# Patient Record
Sex: FEMALE | Race: WHITE | NOT HISPANIC OR LATINO | Employment: OTHER | URBAN - METROPOLITAN AREA
[De-identification: names, ages, dates, MRNs, and addresses within clinical notes are randomized per-mention and may not be internally consistent; named-entity substitution may affect disease eponyms.]

---

## 2017-07-11 ENCOUNTER — TELEPHONE (OUTPATIENT)
Dept: GASTROENTEROLOGY | Facility: CLINIC | Age: 67
End: 2017-07-11

## 2017-07-12 ENCOUNTER — TELEPHONE (OUTPATIENT)
Dept: GASTROENTEROLOGY | Facility: CLINIC | Age: 67
End: 2017-07-12

## 2017-07-13 ENCOUNTER — TELEPHONE (OUTPATIENT)
Dept: GASTROENTEROLOGY | Facility: CLINIC | Age: 67
End: 2017-07-13

## 2017-07-13 NOTE — TELEPHONE ENCOUNTER
----- Message from Afshan Chapman MA sent at 7/13/2017  3:04 PM CDT -----  Contact: self  [phone]  States she is returning your call regarding an appointment on Friday, 7-14-17.  States she doesn't want the appointment because Dr. [facility] recommended her to Dr. Delano Richey and she is stating he is with gastro.  I checked and I told her he is with Cardio but she is wanting to speak to you regarding an appointment with him in gastro.

## 2017-07-13 NOTE — TELEPHONE ENCOUNTER
Spoke with patient.    Offered appt for tomorrow at 2pm for Dr. Mendiola.    Patient stated she will call back later and let us know if she can get off.

## 2017-07-14 ENCOUNTER — OFFICE VISIT (OUTPATIENT)
Dept: GASTROENTEROLOGY | Facility: CLINIC | Age: 67
End: 2017-07-14
Payer: MEDICARE

## 2017-07-14 VITALS
SYSTOLIC BLOOD PRESSURE: 217 MMHG | DIASTOLIC BLOOD PRESSURE: 90 MMHG | BODY MASS INDEX: 33.55 KG/M2 | HEART RATE: 65 BPM | WEIGHT: 182.31 LBS | HEIGHT: 62 IN

## 2017-07-14 DIAGNOSIS — I15.9 SECONDARY HYPERTENSION: ICD-10-CM

## 2017-07-14 DIAGNOSIS — R11.0 NAUSEA: ICD-10-CM

## 2017-07-14 DIAGNOSIS — R13.10 DYSPHAGIA, UNSPECIFIED TYPE: Primary | ICD-10-CM

## 2017-07-14 DIAGNOSIS — K21.9 GASTROESOPHAGEAL REFLUX DISEASE, ESOPHAGITIS PRESENCE NOT SPECIFIED: ICD-10-CM

## 2017-07-14 DIAGNOSIS — K22.0 ACHALASIA: ICD-10-CM

## 2017-07-14 PROCEDURE — 99214 OFFICE O/P EST MOD 30 MIN: CPT | Mod: PBBFAC | Performed by: INTERNAL MEDICINE

## 2017-07-14 PROCEDURE — 1159F MED LIST DOCD IN RCRD: CPT | Mod: ,,, | Performed by: INTERNAL MEDICINE

## 2017-07-14 PROCEDURE — 99204 OFFICE O/P NEW MOD 45 MIN: CPT | Mod: S$PBB,,, | Performed by: INTERNAL MEDICINE

## 2017-07-14 PROCEDURE — 99999 PR PBB SHADOW E&M-EST. PATIENT-LVL IV: CPT | Mod: PBBFAC,,, | Performed by: INTERNAL MEDICINE

## 2017-07-14 NOTE — PROGRESS NOTES
[facility] Consultation Note    Reason for Consult:    Chief Complaint   Patient presents with    Dysphagia    Heartburn    Other     achalasia    Weight Loss    Emesis         PCP:   Primary Doctor No       Referring MD:  Dr. Carlee Lord     HPI:  Alena Anderson is a 67 y.o. female referred to motility clinic by Dr. Lord for evaluation of the following GI problems:    Dysphagia.  Achalasia. Patient reports difficulty swallowing solids and liquids.  Feels that food gets lodged in cervical esophagus.      Symptoms started in 12/2016. Occur constantly. Assocaited with horse voice that developed in the past few weeks.     EGD with food and fluid filled esophagus. Manometry showes achalasia - type ii. Has nocturnal cough. Spits up flegm.     GERD.  Patient reports bothersome heartburn in mid chest.  Regurgitation of acidic liquids.  Daily. No improvemetn w nexium, prilosec, .   Some improvement w Mylanta.        Emesis.  Occasional vomiting.  No nausea.     Weight loss. Lost 40lb since 5/2016.      Denies vomiting, early satiety, abdominal pain, bloating, diarrhea, constipation, BRBPR, melena, weight loss.       Previous Studies:  Manometry 6/21/2017: Elevated lES with elevated IRP 32mmHg.  Panesophageal pressurization consistent w Type II achalasia. Poor bolus transit.     EGD 6/22/2017: Food and liquid in esophagus without structural obstruction. Diffuse gastric erythema. Gastric antral changes suggestive of IM.        ROS:  ROS   Constitutional: No fevers, no chills, no night sweats, + weight loss  ENT: No congestion, + rhinorrhea, no chronic sinus problems  CV: No chest pain, no palpitations  Pulm: + cough, no shortness of breath  Ophtho: No blurry vision, no eye redness  GI: see HPI  Derm: No rash  Heme: No lymphadenopathy, no bruising  MSK: No arthritis, no joint swelling, no Raynauds  : No dysuria, no frequent urination, no blood in urine  Endo: No hot or cold  "intolerance  Neuro: No dizziness, no syncope, no seizure  Psych: No anxiety, no depression        Medical History:   Past Medical History:   Diagnosis Date    Hypertension         Surgical History:   Past Surgical History:   Procedure Laterality Date    APPENDECTOMY      broken leg          Family History:   Family History   Problem Relation Age of Onset    Celiac disease Neg Hx     Cirrhosis Neg Hx     Colon cancer Neg Hx     Crohn's disease Neg Hx     Colon polyps Neg Hx     Cystic fibrosis Neg Hx     Esophageal cancer Neg Hx     Hemochromatosis Neg Hx     Inflammatory bowel disease Neg Hx     Irritable bowel syndrome Neg Hx     Liver cancer Neg Hx     Liver disease Neg Hx     Rectal cancer Neg Hx     Stomach cancer Neg Hx     Ulcerative colitis Neg Hx     Bhanu's disease Neg Hx         Social History:   Social History     Social History    Marital status:      Spouse name: N/A    Number of children: N/A    Years of education: N/A     Social History Main Topics    Smoking status: Never Smoker    Smokeless tobacco: Never Used    Alcohol use No    Drug use: No    Sexual activity: Not Asked     Other Topics Concern    None     Social History Narrative    None        Review of patient's allergies indicates:   Allergen Reactions    Codeine     Morphine     Sulfa (sulfonamide antibiotics)        Current Outpatient Prescriptions   Medication Sig Dispense Refill    SPIRONOLACTONE (ALDACTONE ORAL) Take by mouth.       No current facility-administered medications for this visit.         Objective Findings:  Vital Signs:  BP (!) 217/90   Pulse 65   Ht 5' 2" (1.575 m)   Wt 82.7 kg (182 lb 5.1 oz)   BMI 33.35 kg/m²   Body mass index is 33.35 kg/m².  Repeat /60     Physical Exam:  General appearance: alert, cooperative, no distress  HENT: Normocephalic, atraumatic, neck symmetrical, no nasal discharge  Eyes: conjunctivae/corneas clear, PERRL, EOM's intact  Lungs: clear to " auscultation bilaterally, no dullness to percussion bilaterally  Heart: regular rate and rhythm without rub; no displacement of the PMI  Abdomen: soft, non-tender; bowel sounds normoactive; no organomegaly  Extremities: extremities symmetric; no clubbing, cyanosis, or edema  Integument: Skin color, texture, turgor normal; no rashes; hair distrubution normal  Neurologic: Alert and oriented X 3, normal strength, normal coordination and gait  Psychiatric: no pressured speech; normal affect; no evidence of impaired cognition    Labs:  No results found for: WBC, HGB, HCT, MCV, PLT  No results found for: NA, K, CL, CO2, GLU, BUN, CREATININE, CALCIUM, PROT, ALBUMIN, BILITOT, ALKPHOS, AST, ALT      Assessment and Plan:  Alena Anderson is a 67 y.o. female with history of HTN here for management of achalasia:    Dysphagia to solids and liquids since 12/2016. Type II achalasia on outside manometry. EGD with food and fluid filled esophagus.   [place] [facility] [place]Cont full liquid diet  -Check EGD w E/G bx - 2nd floor    -Esophagogram w 13mm tablet  -Discussed pathophysiology, presentation and treatment of achalasia, including botox, pneumatic dilation, myotomy, POEM. Pt would like to proceed with surgery.   -Ref to Dr. Livingston for myotomy    GERD.    -Mylanta prn  -Nexium prn     Emesis.      Weight loss. Lost 40lb since 5/2016.    -Check Ct chest to r/o paraneoplastic syndrome  -Protein shakes TID     HTN. Repeat /60. In process of changing medication. Asymptomatic. PT will see her PCP in am.      Return in about 3 months (around 10/14/2017).    1. Dysphagia, unspecified type    2. Achalasia    3. Nausea    4. Gastroesophageal reflux disease, esophagitis presence not specified    5. Secondary hypertension          Order summary:  Orders Placed This Encounter    FL Esophagram Complete    CT Chest With Contrast    Ambulatory referral to General Surgery         Thank you so much for allowing me to  participate in the care of Alena Mendiola MD

## 2017-07-17 ENCOUNTER — TELEPHONE (OUTPATIENT)
Dept: ENDOSCOPY | Facility: HOSPITAL | Age: 67
End: 2017-07-17

## 2017-07-17 RX ORDER — BENAZEPRIL HYDROCHLORIDE 20 MG/1
20 TABLET ORAL EVERY MORNING
COMMUNITY

## 2017-07-17 RX ORDER — LOVASTATIN 20 MG/1
20 TABLET ORAL EVERY MORNING
COMMUNITY

## 2017-07-17 RX ORDER — NAPROXEN SODIUM 220 MG/1
81 TABLET, FILM COATED ORAL DAILY
COMMUNITY

## 2017-07-17 NOTE — TELEPHONE ENCOUNTER
Patient contacted to schedule EGD.  Scheduled for 7/26/17 at 9:30 am with Dr Mendiola on 2nd floor.

## 2017-07-20 ENCOUNTER — TELEPHONE (OUTPATIENT)
Dept: GASTROENTEROLOGY | Facility: CLINIC | Age: 67
End: 2017-07-20

## 2017-07-20 ENCOUNTER — TELEPHONE (OUTPATIENT)
Dept: ENDOSCOPY | Facility: HOSPITAL | Age: 67
End: 2017-07-20

## 2017-07-20 NOTE — TELEPHONE ENCOUNTER
Received phone call from patient regarding upcoming EGD on 7/26/17. Reviewed prep instructions-stated understanding.  Patient inquiring about other tests that were ordered by Dr Mendiola. She stated that she is trying to get everything done ASAP and is not able to reach anyone regarding scheduling them.  Informed her that I would send a message to Dr Mendiola and request someone to get in touch with her to assist in getting it taken care of.

## 2017-07-20 NOTE — TELEPHONE ENCOUNTER
Attempted to contact patient without success.    This call is regarding the CT chest and esophagram.    Unable to leave message, voicemail box is not set up.

## 2017-07-21 NOTE — TELEPHONE ENCOUNTER
Gina,  Please attempt again to reach Ms. Anderson to set up test/referral recommended by Dr. Mendiola.  Thanks,  An

## 2017-07-24 ENCOUNTER — TELEPHONE (OUTPATIENT)
Dept: GASTROENTEROLOGY | Facility: CLINIC | Age: 67
End: 2017-07-24

## 2017-07-24 NOTE — TELEPHONE ENCOUNTER
Attempted to contact patient without success.    Unable to leave message due to voicemail not set up. This is the second attempt to contact patient.  House phone is busy unable to get through.    This call is regarding to schedule CT and Esophagram.

## 2017-07-26 ENCOUNTER — ANESTHESIA EVENT (OUTPATIENT)
Dept: ENDOSCOPY | Facility: HOSPITAL | Age: 67
End: 2017-07-26
Payer: MEDICARE

## 2017-07-26 ENCOUNTER — TELEPHONE (OUTPATIENT)
Dept: GASTROENTEROLOGY | Facility: CLINIC | Age: 67
End: 2017-07-26

## 2017-07-26 ENCOUNTER — SURGERY (OUTPATIENT)
Age: 67
End: 2017-07-26

## 2017-07-26 ENCOUNTER — ANESTHESIA (OUTPATIENT)
Dept: ENDOSCOPY | Facility: HOSPITAL | Age: 67
End: 2017-07-26
Payer: MEDICARE

## 2017-07-26 ENCOUNTER — HOSPITAL ENCOUNTER (OUTPATIENT)
Facility: HOSPITAL | Age: 67
Discharge: HOME | End: 2017-07-26
Attending: INTERNAL MEDICINE | Admitting: INTERNAL MEDICINE
Payer: MEDICARE

## 2017-07-26 VITALS
DIASTOLIC BLOOD PRESSURE: 60 MMHG | HEART RATE: 65 BPM | SYSTOLIC BLOOD PRESSURE: 142 MMHG | RESPIRATION RATE: 18 BRPM | TEMPERATURE: 98 F | WEIGHT: 175 LBS | OXYGEN SATURATION: 99 % | BODY MASS INDEX: 32.2 KG/M2 | HEIGHT: 62 IN

## 2017-07-26 DIAGNOSIS — R13.10 DYSPHAGIA, UNSPECIFIED TYPE: Primary | ICD-10-CM

## 2017-07-26 DIAGNOSIS — K22.0 ACHALASIA: ICD-10-CM

## 2017-07-26 PROCEDURE — 88305 TISSUE EXAM BY PATHOLOGIST: CPT | Mod: 26,,, | Performed by: PATHOLOGY

## 2017-07-26 PROCEDURE — 25000003 PHARM REV CODE 250: Performed by: NURSE ANESTHETIST, CERTIFIED REGISTERED

## 2017-07-26 PROCEDURE — 27201012 HC FORCEPS, HOT/COLD, DISP: Performed by: INTERNAL MEDICINE

## 2017-07-26 PROCEDURE — 43239 EGD BIOPSY SINGLE/MULTIPLE: CPT | Performed by: INTERNAL MEDICINE

## 2017-07-26 PROCEDURE — 25000003 PHARM REV CODE 250: Performed by: INTERNAL MEDICINE

## 2017-07-26 PROCEDURE — 63600175 PHARM REV CODE 636 W HCPCS: Performed by: INTERNAL MEDICINE

## 2017-07-26 PROCEDURE — 43239 EGD BIOPSY SINGLE/MULTIPLE: CPT | Mod: ,,, | Performed by: INTERNAL MEDICINE

## 2017-07-26 PROCEDURE — 88312 SPECIAL STAINS GROUP 1: CPT | Mod: 26,,, | Performed by: PATHOLOGY

## 2017-07-26 PROCEDURE — 37000009 HC ANESTHESIA EA ADD 15 MINS: Performed by: INTERNAL MEDICINE

## 2017-07-26 PROCEDURE — 63600175 PHARM REV CODE 636 W HCPCS: Performed by: NURSE ANESTHETIST, CERTIFIED REGISTERED

## 2017-07-26 PROCEDURE — 37000008 HC ANESTHESIA 1ST 15 MINUTES: Performed by: INTERNAL MEDICINE

## 2017-07-26 PROCEDURE — D9220A PRA ANESTHESIA: Mod: ANES,,, | Performed by: ANESTHESIOLOGY

## 2017-07-26 PROCEDURE — D9220A PRA ANESTHESIA: Mod: CRNA,,, | Performed by: NURSE ANESTHETIST, CERTIFIED REGISTERED

## 2017-07-26 PROCEDURE — C1773 RET DEV, INSERTABLE: HCPCS | Performed by: INTERNAL MEDICINE

## 2017-07-26 PROCEDURE — 88305 TISSUE EXAM BY PATHOLOGIST: CPT | Performed by: PATHOLOGY

## 2017-07-26 PROCEDURE — 88342 IMHCHEM/IMCYTCHM 1ST ANTB: CPT | Mod: 26,,, | Performed by: PATHOLOGY

## 2017-07-26 RX ORDER — MIDAZOLAM HYDROCHLORIDE 1 MG/ML
INJECTION INTRAMUSCULAR; INTRAVENOUS
Status: DISCONTINUED | OUTPATIENT
Start: 2017-07-26 | End: 2017-07-26

## 2017-07-26 RX ORDER — PROPOFOL 10 MG/ML
VIAL (ML) INTRAVENOUS
Status: DISCONTINUED | OUTPATIENT
Start: 2017-07-26 | End: 2017-07-26

## 2017-07-26 RX ORDER — ONDANSETRON 2 MG/ML
INJECTION INTRAMUSCULAR; INTRAVENOUS
Status: DISCONTINUED | OUTPATIENT
Start: 2017-07-26 | End: 2017-07-26

## 2017-07-26 RX ORDER — FENTANYL CITRATE 50 UG/ML
INJECTION, SOLUTION INTRAMUSCULAR; INTRAVENOUS
Status: DISCONTINUED | OUTPATIENT
Start: 2017-07-26 | End: 2017-07-26

## 2017-07-26 RX ORDER — SUCCINYLCHOLINE CHLORIDE 20 MG/ML
INJECTION INTRAMUSCULAR; INTRAVENOUS
Status: DISCONTINUED | OUTPATIENT
Start: 2017-07-26 | End: 2017-07-26

## 2017-07-26 RX ORDER — LIDOCAINE HCL/PF 100 MG/5ML
SYRINGE (ML) INTRAVENOUS
Status: DISCONTINUED | OUTPATIENT
Start: 2017-07-26 | End: 2017-07-26

## 2017-07-26 RX ORDER — HYDROMORPHONE HYDROCHLORIDE 1 MG/ML
0.2 INJECTION, SOLUTION INTRAMUSCULAR; INTRAVENOUS; SUBCUTANEOUS EVERY 5 MIN PRN
Status: DISCONTINUED | OUTPATIENT
Start: 2017-07-26 | End: 2017-07-26 | Stop reason: HOSPADM

## 2017-07-26 RX ORDER — ROCURONIUM BROMIDE 10 MG/ML
INJECTION, SOLUTION INTRAVENOUS
Status: DISCONTINUED | OUTPATIENT
Start: 2017-07-26 | End: 2017-07-26

## 2017-07-26 RX ORDER — SODIUM CHLORIDE 0.9 % (FLUSH) 0.9 %
3 SYRINGE (ML) INJECTION
Status: DISCONTINUED | OUTPATIENT
Start: 2017-07-26 | End: 2017-07-26 | Stop reason: HOSPADM

## 2017-07-26 RX ORDER — SODIUM CHLORIDE 9 MG/ML
INJECTION, SOLUTION INTRAVENOUS CONTINUOUS
Status: DISCONTINUED | OUTPATIENT
Start: 2017-07-26 | End: 2017-07-26 | Stop reason: HOSPADM

## 2017-07-26 RX ADMIN — ONDANSETRON 4 MG: 2 INJECTION INTRAMUSCULAR; INTRAVENOUS at 10:07

## 2017-07-26 RX ADMIN — SODIUM CHLORIDE: 0.9 INJECTION, SOLUTION INTRAVENOUS at 09:07

## 2017-07-26 RX ADMIN — LIDOCAINE HYDROCHLORIDE 100 MG: 20 INJECTION, SOLUTION INTRAVENOUS at 10:07

## 2017-07-26 RX ADMIN — FENTANYL CITRATE 100 MCG: 50 INJECTION, SOLUTION INTRAMUSCULAR; INTRAVENOUS at 10:07

## 2017-07-26 RX ADMIN — PROPOFOL 130 MG: 10 INJECTION, EMULSION INTRAVENOUS at 10:07

## 2017-07-26 RX ADMIN — ROCURONIUM BROMIDE 10 MG: 10 INJECTION, SOLUTION INTRAVENOUS at 10:07

## 2017-07-26 RX ADMIN — SUCCINYLCHOLINE CHLORIDE 140 MG: 20 INJECTION, SOLUTION INTRAMUSCULAR; INTRAVENOUS at 10:07

## 2017-07-26 RX ADMIN — MIDAZOLAM HYDROCHLORIDE 2 MG: 1 INJECTION, SOLUTION INTRAMUSCULAR; INTRAVENOUS at 10:07

## 2017-07-26 RX ADMIN — FOLIC ACID: 5 INJECTION, SOLUTION INTRAMUSCULAR; INTRAVENOUS; SUBCUTANEOUS at 11:07

## 2017-07-26 NOTE — PLAN OF CARE
Plan of care discussed with pt and her family. Understanding verbalized. Pt states can discuss results with her famliy

## 2017-07-26 NOTE — PLAN OF CARE
Patient and family state they are ready to be discharged. Instructions  given to patient and family. Both verbalize understanding. Patient tolerating po liquids with no difficulty. Patient denies pain. Anesthesia consent and surgical consent in chart upon patient's discharge from [facility].

## 2017-07-26 NOTE — TRANSFER OF CARE
"Anesthesia Transfer of Care Note    Patient: Alena Anderson    Procedure(s) Performed: Procedure(s) (LRB):  ESOPHAGOGASTRODUODENOSCOPY (EGD) (N/A)    Patient location: PACU    Anesthesia Type: general    Transport from OR: Transported from OR on room air with adequate spontaneous ventilation    Post pain: adequate analgesia    Post assessment: no apparent anesthetic complications    Post vital signs: stable    Level of consciousness: awake    Nausea/Vomiting: no nausea/vomiting    Complications: none    Transfer of care protocol was followed      Last vitals:   Visit Vitals  BP (!) 171/104   Pulse 76   Temp 36.7 °C (98.1 °F) (Oral)   Resp 16   Ht 5' 2" (1.575 m)   Wt 79.4 kg (175 lb)   SpO2 100%   Breastfeeding? No   BMI 32.01 kg/m²     "

## 2017-07-26 NOTE — DISCHARGE INSTRUCTIONS
Upper GI Endoscopy     During endoscopy, a long, flexible tube is used to view the inside of your upper GI tract.      Upper GI endoscopy allows your healthcare provider to look directly into the beginning of your gastrointestinal (GI) tract. The esophagus, stomach, and duodenum (the first part of the small intestine) make up the upper GI tract.   Before the exam  Follow these and any other instructions you are given before your endoscopy. If you dont follow the healthcare providers instructions carefully, the test may need to be canceled or done over:  · Don't eat or drink anything after midnight the night before your exam. If your exam is in the afternoon, drink only clear liquids in the morning. Don't eat or drink anything for 8 hours before the exam. In some cases, you may be able to take medicines with sips of water until 2 hours before the procedure. Speak with your healthcare provider about this.   · Bring your X-rays and any other test results you have.  · Because you will be sedated, arrange for an adult to drive you home after the exam.  · Tell your healthcare provider before the exam if you are taking any medicines or have any medical problems.  The procedure  Here is what to expect:  · You will lie on the endoscopy table. Usually patients lie on the left side.  · You will be monitored and given oxygen.  · Your throat may be numbed with a spray or gargle. You are given medicine through an intravenous (IV) line that will help you relax and remain comfortable. You may be awake or asleep during the procedure.  · The healthcare provider will put the endoscope in your mouth and down your esophagus. It is thinner than most pieces of food that you swallow. It will not affect your breathing. The medicine helps keep you from gagging.  · Air is put into your GI tract to expand it. It can make you burp.  · During the procedure, the healthcare provider can take biopsies (tissue samples), remove abnormalities,  such as polyps, or treat abnormalities through a variety of devices placed through the endoscope. You will not feel this.   · The endoscope carries images of your upper GI tract to a video screen. If you are awake, you may be able to look at the images.  · After the procedure is done, you will rest for a time. An adult must drive you home.  When to call your healthcare provider  Contact your healthcare provider if you have:  · Black or tarry stools, or blood in your stool  · Fever  · Pain in your belly that does not go away  · Nausea and vomiting, or vomiting blood   Date Last Reviewed: 7/1/2016 © 2000-2016 [employer]. [address] [place] [address], [place] [address]. All rights reserved. This information is not intended as a substitute for professional medical care. Always follow your healthcare professional's instructions.        Procedural Sedation (Adult)  You have been given medicine by vein to make you sleep during your surgery. This may have included both a pain medicine and sleeping medicine. Most of the effects have worn off. But you may still have some drowsiness for the next 6 to 8 hours.  Home care  Follow these guidelines when you get home:  · For the next 8 hours, you should be watched by a responsible adult. This person should make sure your condition is not getting worse.  · Don't take any medicine by mouth for pain or for sleep during the next 4 hours. These might react with the medicines you were given in the hospital. This could cause a much stronger response than usual.  · Don't drink any alcohol for the next 24 hours.  · Don't drive, operate dangerous machinery, or make important business or personal decisions during the next 24 hours.  Follow-up care  Follow up with your healthcare provider if you are not alert and back to your usual level of activity within 12 hours.  When to seek medical advice  Call your healthcare provider right away if any of these occur:  · Drowsiness gets  worse  · Weakness or dizziness gets worse  · Repeated vomiting  · You cannot be awakened   Date Last Reviewed: 10/18/2016  © 0535-2825 [employer]. [address] [place] [address], [place] [address]. All rights reserved. This information is not intended as a substitute for professional medical care. Always follow your healthcare professional's instructions.

## 2017-07-26 NOTE — H&P (VIEW-ONLY)
[facility] Consultation Note    Reason for Consult:    Chief Complaint   Patient presents with    Dysphagia    Heartburn    Other     achalasia    Weight Loss    Emesis         PCP:   Primary Doctor No       Referring MD:  Dr. Carlee Lord     HPI:  Alena Anderson is a 67 y.o. female referred to motility clinic by Dr. Lord for evaluation of the following GI problems:    Dysphagia.  Achalasia. Patient reports difficulty swallowing solids and liquids.  Feels that food gets lodged in cervical esophagus.      Symptoms started in 12/2016. Occur constantly. Assocaited with horse voice that developed in the past few weeks.     EGD with food and fluid filled esophagus. Manometry showes achalasia - type ii. Has nocturnal cough. Spits up flegm.     GERD.  Patient reports bothersome heartburn in mid chest.  Regurgitation of acidic liquids.  Daily. No improvemetn w nexium, prilosec, .   Some improvement w Mylanta.        Emesis.  Occasional vomiting.  No nausea.     Weight loss. Lost 40lb since 5/2016.      Denies vomiting, early satiety, abdominal pain, bloating, diarrhea, constipation, BRBPR, melena, weight loss.       Previous Studies:  Manometry 6/21/2017: Elevated lES with elevated IRP 32mmHg.  Panesophageal pressurization consistent w Type II achalasia. Poor bolus transit.     EGD 6/22/2017: Food and liquid in esophagus without structural obstruction. Diffuse gastric erythema. Gastric antral changes suggestive of IM.        ROS:  ROS   Constitutional: No fevers, no chills, no night sweats, + weight loss  ENT: No congestion, + rhinorrhea, no chronic sinus problems  CV: No chest pain, no palpitations  Pulm: + cough, no shortness of breath  Ophtho: No blurry vision, no eye redness  GI: see HPI  Derm: No rash  Heme: No lymphadenopathy, no bruising  MSK: No arthritis, no joint swelling, no Raynauds  : No dysuria, no frequent urination, no blood in urine  Endo: No hot or cold  "intolerance  Neuro: No dizziness, no syncope, no seizure  Psych: No anxiety, no depression        Medical History:   Past Medical History:   Diagnosis Date    Hypertension         Surgical History:   Past Surgical History:   Procedure Laterality Date    APPENDECTOMY      broken leg          Family History:   Family History   Problem Relation Age of Onset    Celiac disease Neg Hx     Cirrhosis Neg Hx     Colon cancer Neg Hx     Crohn's disease Neg Hx     Colon polyps Neg Hx     Cystic fibrosis Neg Hx     Esophageal cancer Neg Hx     Hemochromatosis Neg Hx     Inflammatory bowel disease Neg Hx     Irritable bowel syndrome Neg Hx     Liver cancer Neg Hx     Liver disease Neg Hx     Rectal cancer Neg Hx     Stomach cancer Neg Hx     Ulcerative colitis Neg Hx     Bhanu's disease Neg Hx         Social History:   Social History     Social History    Marital status:      Spouse name: N/A    Number of children: N/A    Years of education: N/A     Social History Main Topics    Smoking status: Never Smoker    Smokeless tobacco: Never Used    Alcohol use No    Drug use: No    Sexual activity: Not Asked     Other Topics Concern    None     Social History Narrative    None        Review of patient's allergies indicates:   Allergen Reactions    Codeine     Morphine     Sulfa (sulfonamide antibiotics)        Current Outpatient Prescriptions   Medication Sig Dispense Refill    SPIRONOLACTONE (ALDACTONE ORAL) Take by mouth.       No current facility-administered medications for this visit.         Objective Findings:  Vital Signs:  BP (!) 217/90   Pulse 65   Ht 5' 2" (1.575 m)   Wt 82.7 kg (182 lb 5.1 oz)   BMI 33.35 kg/m²   Body mass index is 33.35 kg/m².  Repeat /60     Physical Exam:  General appearance: alert, cooperative, no distress  HENT: Normocephalic, atraumatic, neck symmetrical, no nasal discharge  Eyes: conjunctivae/corneas clear, PERRL, EOM's intact  Lungs: clear to " auscultation bilaterally, no dullness to percussion bilaterally  Heart: regular rate and rhythm without rub; no displacement of the PMI  Abdomen: soft, non-tender; bowel sounds normoactive; no organomegaly  Extremities: extremities symmetric; no clubbing, cyanosis, or edema  Integument: Skin color, texture, turgor normal; no rashes; hair distrubution normal  Neurologic: Alert and oriented X 3, normal strength, normal coordination and gait  Psychiatric: no pressured speech; normal affect; no evidence of impaired cognition    Labs:  No results found for: WBC, HGB, HCT, MCV, PLT  No results found for: NA, K, CL, CO2, GLU, BUN, CREATININE, CALCIUM, PROT, ALBUMIN, BILITOT, ALKPHOS, AST, ALT      Assessment and Plan:  Alena Anderson is a 67 y.o. female with history of HTN here for management of achalasia:    Dysphagia to solids and liquids since 12/2016. Type II achalasia on outside manometry. EGD with food and fluid filled esophagus.   [place] [facility] [place] full liquid diet  -Check EGD w E/G bx - 2nd floor    -Esophagogram w 13mm tablet  -Discussed pathophysiology, presentation and treatment of achalasia, including botox, pneumatic dilation, myotomy, POEM. Pt would like to proceed with surgery.   -Ref to Dr. Livingston for myotomy    GERD.    -Mylanta prn  -Nexium prn     Emesis.      Weight loss. Lost 40lb since 5/2016.    -Check Ct chest to r/o paraneoplastic syndrome  -Protein shakes TID     HTN. Repeat /60. In process of changing medication. Asymptomatic. PT will see her PCP in am.      Return in about 3 months (around 10/14/2017).    1. Dysphagia, unspecified type    2. Achalasia    3. Nausea    4. Gastroesophageal reflux disease, esophagitis presence not specified    5. Secondary hypertension          Order summary:  Orders Placed This Encounter    FL Esophagram Complete    CT Chest With Contrast    Ambulatory referral to General Surgery         Thank you so much for allowing me to  participate in the care of Alena Mendiola MD

## 2017-07-26 NOTE — ANESTHESIA PREPROCEDURE EVALUATION
07/26/2017  Alena Anderson is a 67 y.o., female.    Anesthesia Evaluation    I have reviewed the Patient Summary Reports.        Review of Systems  Anesthesia Hx:  No problems with previous Anesthesia Hx of Anesthetic complications (PONV)    Social:  Non-Smoker    Hematology/Oncology:  Hematology Normal   Oncology Normal     EENT/Dental:EENT/Dental Normal   Cardiovascular:   Hypertension    Pulmonary:  Pulmonary Normal    Renal/:  Renal/ Normal     Hepatic/GI:   Achalasia   Musculoskeletal:  Musculoskeletal Normal    Neurological:  Neurology Normal    Endocrine:  Endocrine Normal    Dermatological:  Skin Normal    Psych:  Psychiatric Normal           Physical Exam  General:  Well nourished    Airway/Jaw/Neck:  Airway Findings: Mouth Opening: Normal Tongue: Normal  General Airway Assessment: Adult  Mallampati: II  Improves to II with phonation.  TM Distance: Normal, at least 6 cm  Jaw/Neck Findings:  Neck ROM: Normal ROM      Dental:  Dental Findings: In tact   Chest/Lungs:  Chest/Lungs Findings: Clear to auscultation, Normal Respiratory Rate     Heart/Vascular:  Heart Findings: Rate: Normal  Rhythm: Regular Rhythm  Sounds: Normal             Anesthesia Plan  Type of Anesthesia, risks & benefits discussed:  Anesthesia Type:  general  Patient's Preference: General  Intra-op Monitoring Plan:   Intra-op Monitoring Plan Comments:   Post Op Pain Control Plan:   Post Op Pain Control Plan Comments:   Induction:   IV  Beta Blocker:  Patient is not currently on a Beta-Blocker (No further documentation required).       Informed Consent: Patient understands risks and agrees with Anesthesia plan.  Questions answered. Anesthesia consent signed with patient.  ASA Score: 2     Day of Surgery Review of History & Physical: I have interviewed and examined the patient. I have reviewed the patient's H&P dated:  There are  no significant changes.      Anesthesia Plan Notes: Intubation requested by Dr. Mendiola for aspiration precaution.        Ready For Surgery From Anesthesia Perspective.

## 2017-07-26 NOTE — INTERVAL H&P NOTE
The patient has been examined and the H&P has been reviewed:    I concur with the findings and no changes have occurred since H&P was written.    Anesthesia/Surgery risks, benefits and alternative options discussed and understood by patient/family.    Pre-Procedure H and P Addendum    Patient seen and examined.  History and exam unchanged from prior history and physical.      Procedure: EGD   Indication: dysphagia, achalasia  ASA Class: per anesthesiology  Airway: per anesthesia  Neck Mobility: full range of motion  Mallampatti score: per anesthesia  History of anesthesia problems: no  Family history of anesthesia problems: no  Anesthesia Plan: per anesthesia        There are no hospital problems to display for this patient.

## 2017-07-26 NOTE — PATIENT INSTRUCTIONS
Discharge Summary/Instructions after an Endoscopic Procedure  Patient Name: Alena Anderson  Patient MRN: [id number]  Patient YOB: 1950 Wednesday, July 26, 2017  Yasmin Mendiola MD  RESTRICTIONS ON ACTIVITY:  - DO NOT drive a car, operate machinery, make legal/financial decisions, or   drink alcohol until the day after the procedure.    - The following day: return to full activity including work, except no heavy   lifting, straining or running for 3 days if polyps were removed.  - Diet: Eat and drink normally unless instructed otherwise.  TREATMENT FOR COMMON SIDE EFFECTS:  - Mild abdominal pain, bloating or excessive gas: rest, eat lightly and use   a heating pad.  - Sore Throat - treat with throat lozenges. Gargle with warm salt water.  SYMPTOMS TO WATCH FOR AND REPORT TO YOUR PHYSICIAN:  1. Severe abdominal pain or bloating.  2. Pain in chest.  3. Chills or fever occurring within 24 hours after a procedure.  4. A large amount of rectal bleeding, which would show as bright red,   maroon, or black stools. (A small amount of blood from the rectum is not   serious, especially if hemorrhoids are present.)  5. Because air was used during the procedure, expelling large amounts of air   from your rectum or belching is normal.  6. If a bowel prep was taken, you may not have a bowel movement for 1-3   days.  This is normal.  7. Go directly to the emergency room if you notice any of the following:   Chills and/or fever over 101 F   Persistent vomiting   Severe abdominal pain, other than gas cramps   Severe chest pain   Black, tarry stools   Any bleeding - exceeding one tablespoon  Your doctor recommends these additional instructions:  If any biopsies were performed, my office will call you in 5 to 6 business   days with any results.  You are being discharged to home.   Resume your previous diet.   Continue your present medications.   We are waiting for your pathology results.   Return to my office as previously  scheduled.   The findings and recommendations have been discussed with you.  For questions, problems or results please call your physician - Yasmin Mendiola MD at Work:  [phone].  [facility], EMERGENCY ROOM PHONE NUMBER: [phone]  IF A COMPLICATION OR EMERGENCY SITUATION ARISES AND YOU ARE UNABLE TO REACH   YOUR PHYSICIAN - GO TO THE EMERGENCY ROOM.  Yasmin Mendiola MD  7/26/2017 11:17:10 AM  This report has been verified and signed electronically.

## 2017-07-26 NOTE — TELEPHONE ENCOUNTER
Attempted to contact patient without success.   Attempted to contact her cell and emergency contact.    Left message for patient to call the clinic back.    This message is regarding moving appt to the same day as her imaging appts on august 1st.

## 2017-07-27 NOTE — ANESTHESIA POSTPROCEDURE EVALUATION
"Anesthesia Post Evaluation    Patient: Alena Anderson    Procedure(s) Performed: Procedure(s) (LRB):  ESOPHAGOGASTRODUODENOSCOPY (EGD) (N/A)    Final Anesthesia Type: general  Patient location during evaluation: PACU  Patient participation: Yes- Able to Participate  Level of consciousness: awake and alert  Post-procedure vital signs: reviewed and stable  Pain management: adequate  Airway patency: patent  PONV status at discharge: No PONV  Anesthetic complications: no      Cardiovascular status: blood pressure returned to baseline and stable  Respiratory status: unassisted  Hydration status: euvolemic  Follow-up not needed.        Visit Vitals  BP (!) 142/60   Pulse 65   Temp 36.8 °C (98.2 °F) (Skin)   Resp 18   Ht 5' 2" (1.575 m)   Wt 79.4 kg (175 lb)   SpO2 99%   Breastfeeding? No   BMI 32.01 kg/m²       Pain/Ulcy Score: Pain Assessment Performed: Yes (7/26/2017  2:45 PM)  Presence of Pain: denies (7/26/2017  2:45 PM)  Lucy Score: 9 (7/26/2017 10:55 AM)      "

## 2017-07-31 ENCOUNTER — TELEPHONE (OUTPATIENT)
Dept: GASTROENTEROLOGY | Facility: CLINIC | Age: 67
End: 2017-07-31

## 2017-07-31 ENCOUNTER — TELEPHONE (OUTPATIENT)
Dept: RADIOLOGY | Facility: HOSPITAL | Age: 67
End: 2017-07-31

## 2017-08-01 ENCOUNTER — HOSPITAL ENCOUNTER (OUTPATIENT)
Dept: RADIOLOGY | Facility: HOSPITAL | Age: 67
Discharge: HOME | End: 2017-08-01
Attending: INTERNAL MEDICINE
Payer: MEDICARE

## 2017-08-01 ENCOUNTER — OFFICE VISIT (OUTPATIENT)
Dept: SURGERY | Facility: CLINIC | Age: 67
End: 2017-08-01
Payer: MEDICARE

## 2017-08-01 VITALS
SYSTOLIC BLOOD PRESSURE: 160 MMHG | BODY MASS INDEX: 32.2 KG/M2 | HEIGHT: 62 IN | WEIGHT: 175 LBS | DIASTOLIC BLOOD PRESSURE: 66 MMHG | TEMPERATURE: 99 F | HEART RATE: 74 BPM

## 2017-08-01 DIAGNOSIS — R13.10 DYSPHAGIA, UNSPECIFIED TYPE: ICD-10-CM

## 2017-08-01 DIAGNOSIS — E46 PROTEIN CALORIE MALNUTRITION: ICD-10-CM

## 2017-08-01 DIAGNOSIS — Z01.810 PREOP CARDIOVASCULAR EXAM: ICD-10-CM

## 2017-08-01 DIAGNOSIS — K22.0 ACHALASIA: Primary | ICD-10-CM

## 2017-08-01 LAB
CREAT SERPL-MCNC: 1 MG/DL (ref 0.5–1.4)
SAMPLE: NORMAL

## 2017-08-01 PROCEDURE — 74220 X-RAY XM ESOPHAGUS 1CNTRST: CPT | Mod: 26,GC,, | Performed by: RADIOLOGY

## 2017-08-01 PROCEDURE — 74220 X-RAY XM ESOPHAGUS 1CNTRST: CPT | Mod: TC

## 2017-08-01 PROCEDURE — 71260 CT THORAX DX C+: CPT | Mod: 26,GC,, | Performed by: RADIOLOGY

## 2017-08-01 PROCEDURE — 71260 CT THORAX DX C+: CPT | Mod: TC

## 2017-08-01 PROCEDURE — 1159F MED LIST DOCD IN RCRD: CPT | Mod: ,,, | Performed by: SURGERY

## 2017-08-01 PROCEDURE — 99203 OFFICE O/P NEW LOW 30 MIN: CPT | Mod: S$PBB,,, | Performed by: SURGERY

## 2017-08-01 PROCEDURE — 99999 PR PBB SHADOW E&M-EST. PATIENT-LVL III: CPT | Mod: PBBFAC,,, | Performed by: SURGERY

## 2017-08-01 PROCEDURE — 25500020 PHARM REV CODE 255: Performed by: INTERNAL MEDICINE

## 2017-08-01 RX ADMIN — IOHEXOL 75 ML: 350 INJECTION, SOLUTION INTRAVENOUS at 03:08

## 2017-08-01 NOTE — Clinical Note
August 1, 2017      Larissa Mendiola MD  [address] [facility] [address]           [facility] - General Surgery  [address]  [facility] [address]  Phone: [phone]          Patient: Alena Anderson   MR Number: [id number]   YOB: 1950   Date of Visit: 8/1/2017       Dear Dr. Larissa Mendiola:    Thank you for referring Alena Anderson to me for evaluation. Attached you will find relevant portions of my assessment and plan of care.    If you have questions, please do not hesitate to call me. I look forward to following Alena Anderson along with you.    Sincerely,    Maco Gaitan MD    Enclosure  CC:  No Recipients    If you would like to receive this communication electronically, please contact [email or web address] or [phone] to request more information on [employer] access.    For providers and/or their staff who would like to refer a patient to [facility], please contact us through our one-stop-shop provider referral line, [facility], at [phone].    If you feel you have received this communication in error or would no longer like to receive these types of communications, please e-mail [email or web address]

## 2017-08-01 NOTE — PROGRESS NOTES
History & Physical    SUBJECTIVE:     History of Present Illness:  Patient is a 67 y.o. female presents with achalasia.  For about a year she has had increasing difficulty eating with solids and liquids (if it follows solids).  She has lost 45 pounds but some of this was due to her  passing away last year in April and she feels only 25-30 pounds were since December.  Her dysphagia is associated with reflux, regurgitation, lump in the throat, and cough.  She has had a motility showing achalasia and she has never had a c scope.      Chief Complaint   Patient presents with    Consult     achalasia       Review of patient's allergies indicates:   Allergen Reactions    Codeine     Morphine     Sulfa (sulfonamide antibiotics)        Current Outpatient Prescriptions   Medication Sig Dispense Refill    aspirin 81 MG Chew Take 81 mg by mouth once daily.      benazepril (LOTENSIN) 20 MG tablet Take 20 mg by mouth once daily.      lovastatin (MEVACOR) 20 MG tablet Take 20 mg by mouth every evening.      SPIRONOLACTONE (ALDACTONE ORAL) Take by mouth.       No current facility-administered medications for this visit.        Past Medical History:   Diagnosis Date    Dysphagia     Hypertension     PONV (postoperative nausea and vomiting)      Past Surgical History:   Procedure Laterality Date    APPENDECTOMY      broken leg      CHOLECYSTECTOMY      EYE SURGERY Bilateral     lasik    TONSILLECTOMY       Family History   Problem Relation Age of Onset    Cancer Father 70     pancreatic    Celiac disease Neg Hx     Cirrhosis Neg Hx     Colon cancer Neg Hx     Crohn's disease Neg Hx     Colon polyps Neg Hx     Cystic fibrosis Neg Hx     Esophageal cancer Neg Hx     Hemochromatosis Neg Hx     Inflammatory bowel disease Neg Hx     Irritable bowel syndrome Neg Hx     Liver cancer Neg Hx     Liver disease Neg Hx     Rectal cancer Neg Hx     Stomach cancer Neg Hx     Ulcerative colitis Neg Hx      Bhanu's disease Neg Hx      Social History   Substance Use Topics    Smoking status: Never Smoker    Smokeless tobacco: Never Used    Alcohol use No        Review of Systems:  Review of Systems   Constitutional: Negative for fever.   Respiratory: Negative for chest tightness and shortness of breath.    Cardiovascular: Negative for chest pain.   Gastrointestinal: Negative for abdominal pain, constipation, diarrhea, nausea and vomiting.   Genitourinary: Negative for difficulty urinating and dysuria.   Skin: Negative for rash and wound.   Neurological: Negative for seizures and headaches.   Hematological: Does not bruise/bleed easily.       OBJECTIVE:     Vital Signs (Most Recent)              Physical Exam:  Physical Exam   Constitutional: She is oriented to person, place, and time. She appears well-developed and well-nourished.   Neck: Normal range of motion. Neck supple.   Cardiovascular: Normal rate, regular rhythm and normal heart sounds.    Pulmonary/Chest: Effort normal and breath sounds normal.   Abdominal: Soft. Bowel sounds are normal. She exhibits no distension. There is no tenderness.   Neurological: She is alert and oriented to person, place, and time.   Skin: Skin is warm and dry.   Psychiatric: She has a normal mood and affect. Her behavior is normal. Judgment and thought content normal.   Vitals reviewed.      Laboratory  None available    Diagnostic Results:  egd and ugi: reviewed    ASSESSMENT/PLAN:     Achalasia.  Protein calorie malnutrition.    PLAN:Plan     Awaiting CT result.  Obtain stress test.  For lap Heller with Petey fundoplication.  Stay on liquid diet and try not to lose any weight.

## 2017-08-01 NOTE — LETTER
[facility] - General Surgery  [address]  [place] [address]  Phone: [phone] August 1, 2017      Larissa Mendiola MD  [address] [facility] [address]    Patient: Alena Anderson   MR Number: [id number]   YOB: 1950   Date of Visit: 8/1/2017     Dear Dr. Mendiola:    Thank you for referring Alena Anderson to me for evaluation. Below are the relevant portions of my assessment and plan of care.    Assessment: Patient presents with Achalasia.  Protein calorie malnutrition.     PLAN: Awaiting CT result.  Obtain stress test.  For lap Heller with Petey fundoplication.  Stay on liquid diet and try not to lose any weight.    If you have questions, please do not hesitate to call me. I look forward to following Alena along with you.    Sincerely,      Maco Gaitan MD   Section Head - General, Laparoscopic, Bariatric  Acute Care and Oncologic Surgery   - Surgical Weight Loss Program  [facility]    WSR/troy    CC  Carlee Lord MD

## 2017-08-02 ENCOUNTER — TELEPHONE (OUTPATIENT)
Dept: SURGERY | Facility: CLINIC | Age: 67
End: 2017-08-02

## 2017-08-02 DIAGNOSIS — K22.0 ACHALASIA: Primary | ICD-10-CM

## 2017-08-02 NOTE — ADDENDUM NOTE
Encounter addended by: Aysha Monk RN on: 8/2/2017  2:13 PM<BR>    Actions taken: Visit diagnoses modified, Child order released for a procedure order, Diagnosis association updated, Order list changed

## 2017-08-17 ENCOUNTER — TELEPHONE (OUTPATIENT)
Dept: SURGERY | Facility: CLINIC | Age: 67
End: 2017-08-17

## 2017-08-17 ENCOUNTER — ANESTHESIA EVENT (OUTPATIENT)
Dept: SURGERY | Facility: HOSPITAL | Age: 67
DRG: 326 | End: 2017-08-17
Payer: MEDICARE

## 2017-08-17 RX ORDER — ESOMEPRAZOLE MAGNESIUM 40 MG/1
40 CAPSULE, DELAYED RELEASE ORAL
COMMUNITY

## 2017-08-17 RX ORDER — SPIRONOLACTONE AND HYDROCHLOROTHIAZIDE 25; 25 MG/1; MG/1
1 TABLET ORAL EVERY MORNING
COMMUNITY

## 2017-08-17 NOTE — PRE-PROCEDURE INSTRUCTIONS
Phone call to pt today with pre-op instructions given including NPO after MN, medications to take/hold the morning of surgery, arrival procedure and location, shower with antibacterial soap; anesthesia type discussed. Pts questions answered and concerns addressed. Pt verbalized understanding.

## 2017-08-18 ENCOUNTER — HOSPITAL ENCOUNTER (INPATIENT)
Facility: HOSPITAL | Age: 67
LOS: 1 days | Discharge: HOME | DRG: 326 | End: 2017-08-19
Attending: SURGERY | Admitting: SURGERY
Payer: MEDICARE

## 2017-08-18 ENCOUNTER — ANESTHESIA (OUTPATIENT)
Dept: SURGERY | Facility: HOSPITAL | Age: 67
DRG: 326 | End: 2017-08-18
Payer: MEDICARE

## 2017-08-18 ENCOUNTER — SURGERY (OUTPATIENT)
Age: 67
End: 2017-08-18

## 2017-08-18 DIAGNOSIS — K22.0 ACHALASIA: Primary | ICD-10-CM

## 2017-08-18 PROCEDURE — 63600175 PHARM REV CODE 636 W HCPCS: Performed by: STUDENT IN AN ORGANIZED HEALTH CARE EDUCATION/TRAINING PROGRAM

## 2017-08-18 PROCEDURE — 25000003 PHARM REV CODE 250: Performed by: SURGERY

## 2017-08-18 PROCEDURE — 25000003 PHARM REV CODE 250: Performed by: STUDENT IN AN ORGANIZED HEALTH CARE EDUCATION/TRAINING PROGRAM

## 2017-08-18 PROCEDURE — D9220A PRA ANESTHESIA: Mod: ,,, | Performed by: ANESTHESIOLOGY

## 2017-08-18 PROCEDURE — 37000009 HC ANESTHESIA EA ADD 15 MINS: Performed by: SURGERY

## 2017-08-18 PROCEDURE — 0D844ZZ DIVISION OF ESOPHAGOGASTRIC JUNCTION, PERCUTANEOUS ENDOSCOPIC APPROACH: ICD-10-PCS | Performed by: SURGERY

## 2017-08-18 PROCEDURE — 27201423 OPTIME MED/SURG SUP & DEVICES STERILE SUPPLY: Performed by: SURGERY

## 2017-08-18 PROCEDURE — C9399 UNCLASSIFIED DRUGS OR BIOLOG: HCPCS | Performed by: STUDENT IN AN ORGANIZED HEALTH CARE EDUCATION/TRAINING PROGRAM

## 2017-08-18 PROCEDURE — 36000711: Performed by: SURGERY

## 2017-08-18 PROCEDURE — S0028 INJECTION, FAMOTIDINE, 20 MG: HCPCS | Performed by: STUDENT IN AN ORGANIZED HEALTH CARE EDUCATION/TRAINING PROGRAM

## 2017-08-18 PROCEDURE — 0DV44ZZ RESTRICTION OF ESOPHAGOGASTRIC JUNCTION, PERCUTANEOUS ENDOSCOPIC APPROACH: ICD-10-PCS | Performed by: SURGERY

## 2017-08-18 PROCEDURE — C1768 GRAFT, VASCULAR: HCPCS | Performed by: SURGERY

## 2017-08-18 PROCEDURE — 71000039 HC RECOVERY, EACH ADD'L HOUR: Performed by: SURGERY

## 2017-08-18 PROCEDURE — 25000242 PHARM REV CODE 250 ALT 637 W/ HCPCS: Performed by: STUDENT IN AN ORGANIZED HEALTH CARE EDUCATION/TRAINING PROGRAM

## 2017-08-18 PROCEDURE — 36000710: Performed by: SURGERY

## 2017-08-18 PROCEDURE — 63600175 PHARM REV CODE 636 W HCPCS

## 2017-08-18 PROCEDURE — 37000008 HC ANESTHESIA 1ST 15 MINUTES: Performed by: SURGERY

## 2017-08-18 PROCEDURE — 43279 LAP MYOTOMY HELLER: CPT | Mod: GC,,, | Performed by: SURGERY

## 2017-08-18 PROCEDURE — 94640 AIRWAY INHALATION TREATMENT: CPT

## 2017-08-18 PROCEDURE — 11000001 HC ACUTE MED/SURG PRIVATE ROOM

## 2017-08-18 PROCEDURE — 27000221 HC OXYGEN, UP TO 24 HOURS

## 2017-08-18 PROCEDURE — 71000033 HC RECOVERY, INTIAL HOUR: Performed by: SURGERY

## 2017-08-18 DEVICE — FELT THIN 1INX1IN: Type: IMPLANTABLE DEVICE | Site: ABDOMEN | Status: FUNCTIONAL

## 2017-08-18 RX ORDER — HYDROMORPHONE HCL IN 0.9% NACL 6 MG/30 ML
PATIENT CONTROLLED ANALGESIA SYRINGE INTRAVENOUS CONTINUOUS
Status: DISCONTINUED | OUTPATIENT
Start: 2017-08-18 | End: 2017-08-19

## 2017-08-18 RX ORDER — CEFAZOLIN SODIUM 1 G/3ML
INJECTION, POWDER, FOR SOLUTION INTRAMUSCULAR; INTRAVENOUS
Status: DISCONTINUED | OUTPATIENT
Start: 2017-08-18 | End: 2017-08-18

## 2017-08-18 RX ORDER — SODIUM CHLORIDE 9 MG/ML
INJECTION, SOLUTION INTRAVENOUS CONTINUOUS PRN
Status: DISCONTINUED | OUTPATIENT
Start: 2017-08-18 | End: 2017-08-18

## 2017-08-18 RX ORDER — SODIUM CHLORIDE 9 MG/ML
INJECTION, SOLUTION INTRAVENOUS CONTINUOUS
Status: DISCONTINUED | OUTPATIENT
Start: 2017-08-18 | End: 2017-08-19

## 2017-08-18 RX ORDER — PHENYLEPHRINE HYDROCHLORIDE 10 MG/ML
INJECTION INTRAVENOUS
Status: DISCONTINUED | OUTPATIENT
Start: 2017-08-18 | End: 2017-08-18

## 2017-08-18 RX ORDER — DEXAMETHASONE SODIUM PHOSPHATE 4 MG/ML
INJECTION, SOLUTION INTRA-ARTICULAR; INTRALESIONAL; INTRAMUSCULAR; INTRAVENOUS; SOFT TISSUE
Status: DISCONTINUED | OUTPATIENT
Start: 2017-08-18 | End: 2017-08-18

## 2017-08-18 RX ORDER — GLYCOPYRROLATE 0.2 MG/ML
INJECTION INTRAMUSCULAR; INTRAVENOUS
Status: DISCONTINUED | OUTPATIENT
Start: 2017-08-18 | End: 2017-08-18

## 2017-08-18 RX ORDER — HYDROMORPHONE HYDROCHLORIDE 1 MG/ML
0.2 INJECTION, SOLUTION INTRAMUSCULAR; INTRAVENOUS; SUBCUTANEOUS EVERY 5 MIN PRN
Status: DISCONTINUED | OUTPATIENT
Start: 2017-08-18 | End: 2017-08-18 | Stop reason: HOSPADM

## 2017-08-18 RX ORDER — ENOXAPARIN SODIUM 100 MG/ML
40 INJECTION SUBCUTANEOUS
Status: DISCONTINUED | OUTPATIENT
Start: 2017-08-19 | End: 2017-08-19 | Stop reason: HOSPADM

## 2017-08-18 RX ORDER — LIDOCAINE HCL/PF 100 MG/5ML
SYRINGE (ML) INTRAVENOUS
Status: DISCONTINUED | OUTPATIENT
Start: 2017-08-18 | End: 2017-08-18

## 2017-08-18 RX ORDER — BUPIVACAINE HYDROCHLORIDE 2.5 MG/ML
INJECTION, SOLUTION EPIDURAL; INFILTRATION; INTRACAUDAL
Status: DISCONTINUED | OUTPATIENT
Start: 2017-08-18 | End: 2017-08-18 | Stop reason: HOSPADM

## 2017-08-18 RX ORDER — NEOSTIGMINE METHYLSULFATE 1 MG/ML
INJECTION, SOLUTION INTRAVENOUS
Status: DISCONTINUED | OUTPATIENT
Start: 2017-08-18 | End: 2017-08-18

## 2017-08-18 RX ORDER — KETAMINE HYDROCHLORIDE 100 MG/ML
INJECTION, SOLUTION INTRAMUSCULAR; INTRAVENOUS
Status: DISCONTINUED | OUTPATIENT
Start: 2017-08-18 | End: 2017-08-18

## 2017-08-18 RX ORDER — SODIUM CHLORIDE 0.9 % (FLUSH) 0.9 %
3 SYRINGE (ML) INJECTION EVERY 8 HOURS
Status: DISCONTINUED | OUTPATIENT
Start: 2017-08-18 | End: 2017-08-19 | Stop reason: HOSPADM

## 2017-08-18 RX ORDER — SODIUM CHLORIDE 0.9 % (FLUSH) 0.9 %
3 SYRINGE (ML) INJECTION
Status: DISCONTINUED | OUTPATIENT
Start: 2017-08-18 | End: 2017-08-18

## 2017-08-18 RX ORDER — IPRATROPIUM BROMIDE AND ALBUTEROL SULFATE 2.5; .5 MG/3ML; MG/3ML
3 SOLUTION RESPIRATORY (INHALATION) EVERY 4 HOURS
Status: DISCONTINUED | OUTPATIENT
Start: 2017-08-18 | End: 2017-08-18 | Stop reason: HOSPADM

## 2017-08-18 RX ORDER — FENTANYL CITRATE 50 UG/ML
INJECTION, SOLUTION INTRAMUSCULAR; INTRAVENOUS
Status: DISCONTINUED | OUTPATIENT
Start: 2017-08-18 | End: 2017-08-18

## 2017-08-18 RX ORDER — MIDAZOLAM HYDROCHLORIDE 1 MG/ML
INJECTION, SOLUTION INTRAMUSCULAR; INTRAVENOUS
Status: DISCONTINUED | OUTPATIENT
Start: 2017-08-18 | End: 2017-08-18

## 2017-08-18 RX ORDER — PROPOFOL 10 MG/ML
VIAL (ML) INTRAVENOUS
Status: DISCONTINUED | OUTPATIENT
Start: 2017-08-18 | End: 2017-08-18

## 2017-08-18 RX ORDER — ONDANSETRON 2 MG/ML
INJECTION INTRAMUSCULAR; INTRAVENOUS
Status: DISCONTINUED | OUTPATIENT
Start: 2017-08-18 | End: 2017-08-18

## 2017-08-18 RX ORDER — ROCURONIUM BROMIDE 10 MG/ML
INJECTION, SOLUTION INTRAVENOUS
Status: DISCONTINUED | OUTPATIENT
Start: 2017-08-18 | End: 2017-08-18

## 2017-08-18 RX ORDER — SCOLOPAMINE TRANSDERMAL SYSTEM 1 MG/1
1 PATCH, EXTENDED RELEASE TRANSDERMAL
Status: DISCONTINUED | OUTPATIENT
Start: 2017-08-18 | End: 2017-08-18

## 2017-08-18 RX ORDER — NALOXONE HCL 0.4 MG/ML
0.02 VIAL (ML) INJECTION
Status: DISCONTINUED | OUTPATIENT
Start: 2017-08-18 | End: 2017-08-19 | Stop reason: HOSPADM

## 2017-08-18 RX ORDER — ONDANSETRON 2 MG/ML
4 INJECTION INTRAMUSCULAR; INTRAVENOUS EVERY 8 HOURS PRN
Status: DISCONTINUED | OUTPATIENT
Start: 2017-08-18 | End: 2017-08-19 | Stop reason: HOSPADM

## 2017-08-18 RX ORDER — SODIUM CHLORIDE 9 MG/ML
INJECTION, SOLUTION INTRAVENOUS CONTINUOUS
Status: DISCONTINUED | OUTPATIENT
Start: 2017-08-18 | End: 2017-08-18

## 2017-08-18 RX ORDER — HYDROMORPHONE HCL IN 0.9% NACL 6 MG/30 ML
PATIENT CONTROLLED ANALGESIA SYRINGE INTRAVENOUS
Status: COMPLETED
Start: 2017-08-18 | End: 2017-08-18

## 2017-08-18 RX ORDER — FAMOTIDINE 10 MG/ML
20 INJECTION INTRAVENOUS ONCE
Status: COMPLETED | OUTPATIENT
Start: 2017-08-18 | End: 2017-08-18

## 2017-08-18 RX ORDER — SUCCINYLCHOLINE CHLORIDE 20 MG/ML
INJECTION INTRAMUSCULAR; INTRAVENOUS
Status: DISCONTINUED | OUTPATIENT
Start: 2017-08-18 | End: 2017-08-18

## 2017-08-18 RX ORDER — LIDOCAINE HYDROCHLORIDE 10 MG/ML
1 INJECTION, SOLUTION EPIDURAL; INFILTRATION; INTRACAUDAL; PERINEURAL ONCE
Status: COMPLETED | OUTPATIENT
Start: 2017-08-18 | End: 2017-08-18

## 2017-08-18 RX ORDER — ACETAMINOPHEN 10 MG/ML
INJECTION, SOLUTION INTRAVENOUS
Status: DISCONTINUED | OUTPATIENT
Start: 2017-08-18 | End: 2017-08-18

## 2017-08-18 RX ORDER — METOCLOPRAMIDE HYDROCHLORIDE 5 MG/ML
10 INJECTION INTRAMUSCULAR; INTRAVENOUS ONCE
Status: COMPLETED | OUTPATIENT
Start: 2017-08-18 | End: 2017-08-18

## 2017-08-18 RX ORDER — DIPHENHYDRAMINE HYDROCHLORIDE 50 MG/ML
25 INJECTION INTRAMUSCULAR; INTRAVENOUS EVERY 4 HOURS PRN
Status: DISCONTINUED | OUTPATIENT
Start: 2017-08-18 | End: 2017-08-19 | Stop reason: HOSPADM

## 2017-08-18 RX ADMIN — SUCCINYLCHOLINE CHLORIDE 140 MG: 20 INJECTION, SOLUTION INTRAMUSCULAR; INTRAVENOUS at 12:08

## 2017-08-18 RX ADMIN — GLYCOPYRROLATE 0.8 MG: 0.2 INJECTION, SOLUTION INTRAMUSCULAR; INTRAVENOUS at 02:08

## 2017-08-18 RX ADMIN — Medication: at 03:08

## 2017-08-18 RX ADMIN — SUGAMMADEX 305 MG: 100 INJECTION, SOLUTION INTRAVENOUS at 02:08

## 2017-08-18 RX ADMIN — ROCURONIUM BROMIDE 5 MG: 10 INJECTION, SOLUTION INTRAVENOUS at 12:08

## 2017-08-18 RX ADMIN — EPHEDRINE SULFATE 10 MG: 50 INJECTION, SOLUTION INTRAMUSCULAR; INTRAVENOUS; SUBCUTANEOUS at 01:08

## 2017-08-18 RX ADMIN — KETAMINE HYDROCHLORIDE 30 MG: 100 INJECTION, SOLUTION, CONCENTRATE INTRAMUSCULAR; INTRAVENOUS at 01:08

## 2017-08-18 RX ADMIN — ACETAMINOPHEN 1000 MG: 10 INJECTION, SOLUTION INTRAVENOUS at 12:08

## 2017-08-18 RX ADMIN — FAMOTIDINE 20 MG: 10 INJECTION, SOLUTION INTRAVENOUS at 10:08

## 2017-08-18 RX ADMIN — LIDOCAINE HYDROCHLORIDE 75 MG: 20 INJECTION, SOLUTION INTRAVENOUS at 12:08

## 2017-08-18 RX ADMIN — FENTANYL CITRATE 50 MCG: 50 INJECTION, SOLUTION INTRAMUSCULAR; INTRAVENOUS at 01:08

## 2017-08-18 RX ADMIN — MIDAZOLAM HYDROCHLORIDE 2 MG: 1 INJECTION, SOLUTION INTRAMUSCULAR; INTRAVENOUS at 12:08

## 2017-08-18 RX ADMIN — FENTANYL CITRATE 100 MCG: 50 INJECTION, SOLUTION INTRAMUSCULAR; INTRAVENOUS at 12:08

## 2017-08-18 RX ADMIN — KETAMINE HYDROCHLORIDE 20 MG: 100 INJECTION, SOLUTION, CONCENTRATE INTRAMUSCULAR; INTRAVENOUS at 01:08

## 2017-08-18 RX ADMIN — BUPIVACAINE HYDROCHLORIDE 9 ML: 2.5 INJECTION, SOLUTION EPIDURAL; INFILTRATION; INTRACAUDAL; PERINEURAL at 01:08

## 2017-08-18 RX ADMIN — SODIUM CHLORIDE: 0.9 INJECTION, SOLUTION INTRAVENOUS at 10:08

## 2017-08-18 RX ADMIN — PHENYLEPHRINE HYDROCHLORIDE 200 MCG: 10 INJECTION INTRAVENOUS at 12:08

## 2017-08-18 RX ADMIN — SCOPALAMINE 1 PATCH: 1 PATCH, EXTENDED RELEASE TRANSDERMAL at 10:08

## 2017-08-18 RX ADMIN — DEXAMETHASONE SODIUM PHOSPHATE 4 MG: 4 INJECTION, SOLUTION INTRAMUSCULAR; INTRAVENOUS at 12:08

## 2017-08-18 RX ADMIN — IPRATROPIUM BROMIDE AND ALBUTEROL SULFATE 3 ML: .5; 3 SOLUTION RESPIRATORY (INHALATION) at 04:08

## 2017-08-18 RX ADMIN — ROCURONIUM BROMIDE 35 MG: 10 INJECTION, SOLUTION INTRAVENOUS at 01:08

## 2017-08-18 RX ADMIN — SODIUM CHLORIDE, SODIUM GLUCONATE, SODIUM ACETATE, POTASSIUM CHLORIDE, MAGNESIUM CHLORIDE, SODIUM PHOSPHATE, DIBASIC, AND POTASSIUM PHOSPHATE: .53; .5; .37; .037; .03; .012; .00082 INJECTION, SOLUTION INTRAVENOUS at 01:08

## 2017-08-18 RX ADMIN — NEOSTIGMINE METHYLSULFATE 5 MG: 1 INJECTION INTRAVENOUS at 02:08

## 2017-08-18 RX ADMIN — PHENYLEPHRINE HYDROCHLORIDE 100 MCG: 10 INJECTION INTRAVENOUS at 12:08

## 2017-08-18 RX ADMIN — IPRATROPIUM BROMIDE AND ALBUTEROL SULFATE 3 ML: .5; 3 SOLUTION RESPIRATORY (INHALATION) at 07:08

## 2017-08-18 RX ADMIN — LIDOCAINE HYDROCHLORIDE 0.1 ML: 10 INJECTION, SOLUTION EPIDURAL; INFILTRATION; INTRACAUDAL; PERINEURAL at 10:08

## 2017-08-18 RX ADMIN — CEFAZOLIN 2 G: 1 INJECTION, POWDER, FOR SOLUTION INTRAVENOUS at 12:08

## 2017-08-18 RX ADMIN — METOCLOPRAMIDE 10 MG: 5 INJECTION, SOLUTION INTRAMUSCULAR; INTRAVENOUS at 10:08

## 2017-08-18 RX ADMIN — SODIUM CHLORIDE, SODIUM GLUCONATE, SODIUM ACETATE, POTASSIUM CHLORIDE, MAGNESIUM CHLORIDE, SODIUM PHOSPHATE, DIBASIC, AND POTASSIUM PHOSPHATE: .53; .5; .37; .037; .03; .012; .00082 INJECTION, SOLUTION INTRAVENOUS at 12:08

## 2017-08-18 RX ADMIN — ONDANSETRON 4 MG: 2 INJECTION INTRAMUSCULAR; INTRAVENOUS at 02:08

## 2017-08-18 RX ADMIN — SODIUM CHLORIDE: 0.9 INJECTION, SOLUTION INTRAVENOUS at 12:08

## 2017-08-18 RX ADMIN — PROPOFOL 170 MG: 10 INJECTION, EMULSION INTRAVENOUS at 12:08

## 2017-08-18 RX ADMIN — SODIUM CHLORIDE: 0.9 INJECTION, SOLUTION INTRAVENOUS at 03:08

## 2017-08-18 NOTE — NURSING TRANSFER
Nursing Transfer Note      8/18/2017     Transfer to: 503    Transfer via: Stretcher    Transfer with: IV Pump; Cardiac monitor in place    Transported by: PCT    Medicines sent: N/A    Chart send with patient: Yes    Notified: daughter; Report called to NERISSA Mtz    Patient reassessed at: [id number]

## 2017-08-18 NOTE — INTERVAL H&P NOTE
The patient has been examined and the H&P has been reviewed:    I concur with the findings and no changes have occurred since H&P was written.    Anesthesia/Surgery risks, benefits and alternative options discussed and understood by patient/family.          Active Hospital Problems    Diagnosis  POA    Achalasia [K22.0]  Yes      Resolved Hospital Problems    Diagnosis Date Resolved POA   No resolved problems to display.

## 2017-08-18 NOTE — ANESTHESIA RELEASE NOTE
"Anesthesia Release from PACU Note    Patient: Alena Anderson    Procedure(s) Performed: Procedure(s) (LRB):  YLQPGKV-ELVSTI-ZIBCSCHKAUIO (N/A)  OCOMFUYCWIFUBN-EGI-ZKTBLUTSZFLA (N/A)    Anesthesia type: general    Post pain: Adequate analgesia    Post assessment: no apparent anesthetic complications, tolerated procedure well and no evidence of recall    Last Vitals:   Visit Vitals  BP (!) 147/67 (BP Location: Right arm, Patient Position: Lying)   Pulse (!) 53   Temp 35.6 °C (96.1 °F) (Axillary)   Resp 10   Ht 5' 2" (1.575 m)   Wt 76.2 kg (168 lb)   SpO2 99%   Breastfeeding? No   BMI 30.73 kg/m²       Post vital signs: stable    Level of consciousness: awake, alert  and oriented    Nausea/Vomiting: no nausea/no vomiting    Complications: none    Airway Patency: patent    Respiratory: unassisted, spontaneous ventilation    Cardiovascular: stable and blood pressure at baseline    Hydration: euvolemic  "

## 2017-08-18 NOTE — H&P (VIEW-ONLY)
History & Physical    SUBJECTIVE:     History of Present Illness:  Patient is a 67 y.o. female presents with achalasia.  For about a year she has had increasing difficulty eating with solids and liquids (if it follows solids).  She has lost 45 pounds but some of this was due to her  passing away last year in April and she feels only 25-30 pounds were since December.  Her dysphagia is associated with reflux, regurgitation, lump in the throat, and cough.  She has had a motility showing achalasia and she has never had a c scope.      Chief Complaint   Patient presents with    Consult     achalasia       Review of patient's allergies indicates:   Allergen Reactions    Codeine     Morphine     Sulfa (sulfonamide antibiotics)        Current Outpatient Prescriptions   Medication Sig Dispense Refill    aspirin 81 MG Chew Take 81 mg by mouth once daily.      benazepril (LOTENSIN) 20 MG tablet Take 20 mg by mouth once daily.      lovastatin (MEVACOR) 20 MG tablet Take 20 mg by mouth every evening.      SPIRONOLACTONE (ALDACTONE ORAL) Take by mouth.       No current facility-administered medications for this visit.        Past Medical History:   Diagnosis Date    Dysphagia     Hypertension     PONV (postoperative nausea and vomiting)      Past Surgical History:   Procedure Laterality Date    APPENDECTOMY      broken leg      CHOLECYSTECTOMY      EYE SURGERY Bilateral     lasik    TONSILLECTOMY       Family History   Problem Relation Age of Onset    Cancer Father 70     pancreatic    Celiac disease Neg Hx     Cirrhosis Neg Hx     Colon cancer Neg Hx     Crohn's disease Neg Hx     Colon polyps Neg Hx     Cystic fibrosis Neg Hx     Esophageal cancer Neg Hx     Hemochromatosis Neg Hx     Inflammatory bowel disease Neg Hx     Irritable bowel syndrome Neg Hx     Liver cancer Neg Hx     Liver disease Neg Hx     Rectal cancer Neg Hx     Stomach cancer Neg Hx     Ulcerative colitis Neg Hx      Bhanu's disease Neg Hx      Social History   Substance Use Topics    Smoking status: Never Smoker    Smokeless tobacco: Never Used    Alcohol use No        Review of Systems:  Review of Systems   Constitutional: Negative for fever.   Respiratory: Negative for chest tightness and shortness of breath.    Cardiovascular: Negative for chest pain.   Gastrointestinal: Negative for abdominal pain, constipation, diarrhea, nausea and vomiting.   Genitourinary: Negative for difficulty urinating and dysuria.   Skin: Negative for rash and wound.   Neurological: Negative for seizures and headaches.   Hematological: Does not bruise/bleed easily.       OBJECTIVE:     Vital Signs (Most Recent)              Physical Exam:  Physical Exam   Constitutional: She is oriented to person, place, and time. She appears well-developed and well-nourished.   Neck: Normal range of motion. Neck supple.   Cardiovascular: Normal rate, regular rhythm and normal heart sounds.    Pulmonary/Chest: Effort normal and breath sounds normal.   Abdominal: Soft. Bowel sounds are normal. She exhibits no distension. There is no tenderness.   Neurological: She is alert and oriented to person, place, and time.   Skin: Skin is warm and dry.   Psychiatric: She has a normal mood and affect. Her behavior is normal. Judgment and thought content normal.   Vitals reviewed.      Laboratory  None available    Diagnostic Results:  egd and ugi: reviewed    ASSESSMENT/PLAN:     Achalasia.  Protein calorie malnutrition.    PLAN:Plan     Awaiting CT result.  Obtain stress test.  For lap Heller with Petye fundoplication.  Stay on liquid diet and try not to lose any weight.

## 2017-08-18 NOTE — PLAN OF CARE
Pt AAOx4. No complaints of pain or discomfort. IVF infusing as ordered with Dilaudid PCA in use. Lap sites to abdomen with band-aids in place remain CDI. VSS. Safety maintained.

## 2017-08-18 NOTE — BRIEF OP NOTE
[facility]  Brief Operative Note    SUMMARY     Surgery Date: 8/18/2017     Surgeon(s) and Role:     * Maco Gaitan MD - Primary     * Ar Abdul MD - Resident - Assisting        Pre-op Diagnosis:  Achalasia [K22.0]    Post-op Diagnosis:  Post-Op Diagnosis Codes:     * Achalasia [K22.0]    Procedure(s) (LRB):  ZPRJLKR-UUZEWS-NBXNLWXGNOWX (N/A)  YEJEILQHXMMKWG-WWJ-SBYDRGLOBMYW (N/A)    Anesthesia: General    Description of Procedure: heller myotomy, with nancy fundoplication      Estimated Blood Loss: * No values recorded between 8/18/2017  1:06 PM and 8/18/2017  2:15 PM *         Specimens:   Specimen (12h ago through future)    None

## 2017-08-18 NOTE — ANESTHESIA PREPROCEDURE EVALUATION
[facility]  Anesthesia Pre-Operative Evaluation         Patient Name: Alena Anderson  YOB: 1950  MRN: [id number]    SUBJECTIVE:     Pre-operative evaluation for Procedure(s) (LRB):  LQQKELJ-YSTESZ-LPFUAZSDVBJT (N/A)  PZPMENYPBQDXXM-GBN-YKEHXUZXRYGO (N/A)     08/18/2017    Alena Anderson is a 67 y.o. female w/ a significant PMHx of HTN and dysphagia to solids and associated 45 lb weight loss over the past year, recently discovered to be achalasia on EGD who presents for the above procedure.    LDA: None documented.     Prev airway: None documented.    Drips: None documented.      Patient Active Problem List   Diagnosis    Achalasia    Protein calorie malnutrition       Review of patient's allergies indicates:   Allergen Reactions    Codeine     Morphine     Sulfa (sulfonamide antibiotics)        Current Inpatient Medications:      No current facility-administered medications on file prior to encounter.      Current Outpatient Prescriptions on File Prior to Encounter   Medication Sig Dispense Refill    aspirin 81 MG Chew Take 81 mg by mouth once daily.      benazepril (LOTENSIN) 20 MG tablet Take 20 mg by mouth every morning.       lovastatin (MEVACOR) 20 MG tablet Take 20 mg by mouth every morning.          Past Surgical History:   Procedure Laterality Date    APPENDECTOMY      broken leg      CHOLECYSTECTOMY      EYE SURGERY Bilateral     lasik    TONSILLECTOMY         Social History     Social History    Marital status:      Spouse name: N/A    Number of children: N/A    Years of education: N/A     Occupational History    Not on file.     Social History Main Topics    Smoking status: Never Smoker    Smokeless tobacco: Never Used    Alcohol use No    Drug use: No    Sexual activity: Not on file     Other Topics Concern    Not on file     Social History Narrative    No narrative on file       OBJECTIVE:     Vital Signs Range (Last 24H):          CBC:   No results for input(s): WBC, RBC, HGB, HCT, PLT, MCV, MCH, MCHC in the last 72 hours.    CMP: No results for input(s): NA, K, CL, CO2, BUN, CREATININE, GLU, MG, PHOS, CALCIUM, ALBUMIN, PROT, ALKPHOS, ALT, AST, BILITOT in the last 72 hours.    INR:  No results for input(s): INR, PROTIME, APTT in the last 72 hours.    Invalid input(s): PT    Diagnostic Studies: No relevant studies.    EKG: No recent studies available.    2D ECHO:  No results found for this or any previous visit.      ASSESSMENT/PLAN:     Anesthesia Evaluation    I have reviewed the Patient Summary Reports.     I have reviewed the Medications.     Review of Systems  Anesthesia Hx:  No problems with previous Anesthesia Hx of Anesthetic complications PONV History of prior surgery of interest to airway management or planning: Previous anesthesia: General Airway issues documented on chart review include mask, easy, easy direct laryngoscopy  Denies Family Hx of Anesthesia complications.  Personal Hx of Anesthesia complications, Post-Operative Nausea/Vomiting, in the past, but not with recent anesthetics / prophylaxis  Severe Sore Throat after Anesthesia   Social:  Non-Smoker, No Alcohol Use    Hematology/Oncology:        Denies Current/Recent Cancer   EENT/Dental:   denies chronic allergic rhinitis   Cardiovascular:   Exercise tolerance: good Hypertension Denies MI.  Denies CAD.    Denies CABG/stent.  Denies Dysrhythmias.   Denies Angina.    Pulmonary:   Denies Pneumonia Denies Asthma.  Denies Shortness of breath.  Denies Recent URI.    Renal/:   Denies Chronic Renal Disease.     Hepatic/GI:   Denies PUD. Denies Hiatal Hernia. GERD, well controlled Achalasia   Neurological:   Denies TIA. Denies CVA. Denies Seizures.    Endocrine:   Denies Diabetes.    Psych:   Denies Psychiatric History.          Physical Exam  General:  Well nourished    Airway/Jaw/Neck:  Airway Findings: Mouth Opening: Normal Tongue: Normal  General Airway Assessment: Adult   Mallampati: III  Improves to II with phonation.  TM Distance: Normal, at least 6 cm  Jaw/Neck Findings:  Neck ROM: Normal ROM      Dental:  Dental Findings:    Chest/Lungs:  Chest/Lungs Findings: Clear to auscultation, Normal Respiratory Rate     Heart/Vascular:  Heart Findings: Rate: Normal  Rhythm: Regular Rhythm  Sounds: Normal        Mental Status:  Mental Status Findings:  Cooperative, Alert and Oriented         Anesthesia Plan  Type of Anesthesia, risks & benefits discussed:  Anesthesia Type:  general  Patient's Preference:   Intra-op Monitoring Plan: standard ASA monitors  Intra-op Monitoring Plan Comments:   Post Op Pain Control Plan: multimodal analgesia and IV/PO Opioids PRN  Post Op Pain Control Plan Comments:   Induction:   IV  Beta Blocker:  Patient is not currently on a Beta-Blocker (No further documentation required).       Informed Consent: Patient understands risks and agrees with Anesthesia plan.  Questions answered. Anesthesia consent signed with patient.  ASA Score: 2     Day of Surgery Review of History & Physical:    H&P update referred to the surgeon.         Ready For Surgery From Anesthesia Perspective.

## 2017-08-18 NOTE — TRANSFER OF CARE
"Anesthesia Transfer of Care Note    Patient: Alena Anderson    Procedure(s) Performed: Procedure(s) (LRB):  IOPRBNO-XRUWPP-HMDKJZKEOZJJ (N/A)  FDUVYSLHFSNRIU-LEK-CELPFWQUGFTG (N/A)    Patient location: PACU    Anesthesia Type: general    Transport from OR: Transported from OR on 6-10 L/min O2 by face mask with adequate spontaneous ventilation    Post pain: adequate analgesia    Post assessment: no apparent anesthetic complications    Post vital signs: stable    Level of consciousness: awake    Nausea/Vomiting: no nausea/vomiting    Complications: none    Transfer of care protocol was followed      Last vitals:   Visit Vitals  BP (!) 184/77 (BP Location: Right arm, Patient Position: Lying)   Pulse 75   Temp 35.6 °C (96.1 °F) (Axillary)   Resp 12   Ht 5' 2" (1.575 m)   Wt 76.2 kg (168 lb)   SpO2 100%   Breastfeeding? No   BMI 30.73 kg/m²     "

## 2017-08-18 NOTE — ANESTHESIA POSTPROCEDURE EVALUATION
"Anesthesia Post Evaluation    Patient: Alena Anderson    Procedure(s) Performed: Procedure(s) (LRB):  LMUZWTK-OJJZWV-KNLLANZAQMMG (N/A)  LOZLOAZMPAJGUE-YOV-ONIMZSQKWKSD (N/A)    Final Anesthesia Type: general  Patient location during evaluation: PACU  Patient participation: Yes- Able to Participate  Level of consciousness: awake and alert and awake  Post-procedure vital signs: reviewed and stable  Pain management: adequate  Airway patency: patent  PONV status at discharge: No PONV  Anesthetic complications: no      Cardiovascular status: blood pressure returned to baseline  Respiratory status: unassisted and spontaneous ventilation  Hydration status: euvolemic  Follow-up not needed.        Visit Vitals  BP (!) 147/67 (BP Location: Right arm, Patient Position: Lying)   Pulse (!) 53   Temp 35.6 °C (96.1 °F) (Axillary)   Resp 10   Ht 5' 2" (1.575 m)   Wt 76.2 kg (168 lb)   SpO2 99%   Breastfeeding? No   BMI 30.73 kg/m²       Pain/Lucy Score: Pain Assessment Performed: Yes (8/18/2017  3:30 PM)  Presence of Pain: denies (8/18/2017  3:30 PM)  Pain Rating Prior to Med Admin: 2 (8/18/2017  3:03 PM)  Lucy Score: 10 (8/18/2017  3:30 PM)    No evidence of aspiration pneumonitis. Will be watched over night  "

## 2017-08-18 NOTE — BRIEF OP NOTE
Operative Note       Surgery Date: 8/18/2017     Surgeon(s) and Role:     * Maco Gaitan MD - Primary     * Ar Abdul MD - Resident - Assisting    Pre-op Diagnosis:  Achalasia [K22.0]    Post-op Diagnosis:  Achalasia [K22.0]    Procedure(s) (LRB):  OVQYQJO-EYOJRV-XUAROQKVLYKB (N/A)  [id number]-[id number] (N/A)    Anesthesia: General    Procedure in Detail/Findings:  Heller/nancy without surgical complication but on intubation there was possible aspiration.    Estimated Blood Loss: Minimal           Specimens     None        Implants:   Implant Name Type Inv. Item Serial No.  Lot No. LRB No. Used   FELT THIN [id number] - [id number]   [employer] [id number]   [employer] [id number] N/A 1              Disposition: PACU - hemodynamically stable.           Condition: Good    Attestation:  I was present and scrubbed for the entire procedure.

## 2017-08-19 VITALS
TEMPERATURE: 99 F | OXYGEN SATURATION: 98 % | RESPIRATION RATE: 17 BRPM | HEART RATE: 67 BPM | WEIGHT: 168 LBS | DIASTOLIC BLOOD PRESSURE: 71 MMHG | BODY MASS INDEX: 30.91 KG/M2 | SYSTOLIC BLOOD PRESSURE: 160 MMHG | HEIGHT: 62 IN

## 2017-08-19 LAB
ANION GAP SERPL CALC-SCNC: 8 MMOL/L
BASOPHILS # BLD AUTO: 0.02 K/UL
BASOPHILS NFR BLD: 0.2 %
BUN SERPL-MCNC: 6 MG/DL
CALCIUM SERPL-MCNC: 8.4 MG/DL
CHLORIDE SERPL-SCNC: 102 MMOL/L
CO2 SERPL-SCNC: 21 MMOL/L
CREAT SERPL-MCNC: 0.8 MG/DL
DIFFERENTIAL METHOD: ABNORMAL
EOSINOPHIL # BLD AUTO: 0 K/UL
EOSINOPHIL NFR BLD: 0 %
ERYTHROCYTE [DISTWIDTH] IN BLOOD BY AUTOMATED COUNT: 12.4 %
EST. GFR  (AFRICAN AMERICAN): >60 ML/MIN/1.73 M^2
EST. GFR  (NON AFRICAN AMERICAN): >60 ML/MIN/1.73 M^2
GLUCOSE SERPL-MCNC: 116 MG/DL
HCT VFR BLD AUTO: 37.3 %
HGB BLD-MCNC: 12.9 G/DL
LYMPHOCYTES # BLD AUTO: 1 K/UL
LYMPHOCYTES NFR BLD: 12 %
MAGNESIUM SERPL-MCNC: 1.7 MG/DL
MCH RBC QN AUTO: 29.1 PG
MCHC RBC AUTO-ENTMCNC: 34.6 G/DL
MCV RBC AUTO: 84 FL
MONOCYTES # BLD AUTO: 0.4 K/UL
MONOCYTES NFR BLD: 5.3 %
NEUTROPHILS # BLD AUTO: 6.7 K/UL
NEUTROPHILS NFR BLD: 82.3 %
PHOSPHATE SERPL-MCNC: 2.8 MG/DL
PLATELET # BLD AUTO: 276 K/UL
PMV BLD AUTO: 9.5 FL
POTASSIUM SERPL-SCNC: 4.5 MMOL/L
RBC # BLD AUTO: 4.43 M/UL
SODIUM SERPL-SCNC: 131 MMOL/L
WBC # BLD AUTO: 8.1 K/UL

## 2017-08-19 PROCEDURE — 36415 COLL VENOUS BLD VENIPUNCTURE: CPT

## 2017-08-19 PROCEDURE — 83735 ASSAY OF MAGNESIUM: CPT

## 2017-08-19 PROCEDURE — 80048 BASIC METABOLIC PNL TOTAL CA: CPT

## 2017-08-19 PROCEDURE — 85025 COMPLETE CBC W/AUTO DIFF WBC: CPT

## 2017-08-19 PROCEDURE — 84100 ASSAY OF PHOSPHORUS: CPT

## 2017-08-19 PROCEDURE — 94770 HC EXHALED C02 TEST: CPT

## 2017-08-19 PROCEDURE — 99900035 HC TECH TIME PER 15 MIN (STAT)

## 2017-08-19 RX ORDER — HYDROCODONE BITARTRATE AND ACETAMINOPHEN 7.5; 325 MG/15ML; MG/15ML
15 SOLUTION ORAL EVERY 4 HOURS PRN
Qty: 118 ML | Refills: 0 | Status: SHIPPED | OUTPATIENT
Start: 2017-08-19

## 2017-08-19 RX ORDER — HYDROCODONE BITARTRATE AND ACETAMINOPHEN 7.5; 325 MG/15ML; MG/15ML
15 SOLUTION ORAL EVERY 4 HOURS PRN
Status: DISCONTINUED | OUTPATIENT
Start: 2017-08-19 | End: 2017-08-19 | Stop reason: HOSPADM

## 2017-08-19 RX ORDER — HYDROCODONE BITARTRATE AND ACETAMINOPHEN 7.5; 325 MG/15ML; MG/15ML
15 SOLUTION ORAL EVERY 4 HOURS PRN
Qty: 118 ML | Refills: 0 | Status: SHIPPED | OUTPATIENT
Start: 2017-08-19 | End: 2017-08-19

## 2017-08-19 NOTE — HPI
Patient is a 67 y.o. female presents with achalasia.  For about a year she has had increasing difficulty eating with solids and liquids (if it follows solids).  She has lost 45 pounds but some of this was due to her  passing away last year in April and she feels only 25-30 pounds were since December.  Her dysphagia is associated with reflux, regurgitation, lump in the throat, and cough.  She has had a motility showing achalasia and she has never had a c scope.

## 2017-08-19 NOTE — PROGRESS NOTES
Patient Consulted by CTTS:     The following was discussed by the Tobacco Treatment Specialist:      Patient stated no tobacco usage.

## 2017-08-19 NOTE — DISCHARGE SUMMARY
[facility]  General Surgery  Discharge Summary      Patient Name: Alena Anderson  MRN: [id number]  Admission Date: 8/18/2017  Hospital Length of Stay: 1 days  Discharge Date and Time:  08/19/2017 11:11 AM  Attending Physician: Maco Gaitan MD   Discharging Provider: Ar Abdul MD  Primary Care Provider: Primary Doctor No    HPI:   Patient is a 67 y.o. female presents with achalasia.  For about a year she has had increasing difficulty eating with solids and liquids (if it follows solids).  She has lost 45 pounds but some of this was due to her  passing away last year in April and she feels only 25-30 pounds were since December.  Her dysphagia is associated with reflux, regurgitation, lump in the throat, and cough.  She has had a motility showing achalasia and she has never had a c scope.       Procedure(s) (LRB):  [id number] (N/A)  [id number]-[id number] (N/A)      Indwelling Lines/Drains at time of discharge:   Lines/Drains/Airways          No matching active lines, drains, or airways        Hospital Course: Underwent Lap heller myotomy and nancy fundoplication on 8/18/17    This patient was admitted to the hospital following the above rpocedure. She tolerated this procedure with no apparent complications. She was transferred to the floor where she underwent a normal post operative recovery. She was advanced to a liquid diet, and she tolerated this without any difficulties. Her pain was controlled with po medications, she was tolerating ambulation, and she had regular bowel and bladder function. She was discharged home in good condition, with follow up in 2 weeks.       Consults:     Significant Diagnostic Studies: see epic      Pending Diagnostic Studies:     None        Final Active Diagnoses:    Diagnosis Date Noted POA    PRINCIPAL PROBLEM:  Achalasia [K22.0] 07/26/2017 Yes      Problems Resolved During this Admission:    Diagnosis Date Noted Date  Resolved POA      Discharged Condition: good    Disposition: Home or Self Care    Follow Up:  Follow-up Information     Maco Gaitan MD In 2 weeks.    Specialties:  General Surgery, Bariatrics  Contact information:  Karin [address]  [facility] [id number] [phone]                 Patient Instructions:     Diet general   Order Comments: Full liquids for one week and soft for one week     Activity as tolerated     Lifting restrictions   Order Comments: Not more than 10 pounds for 6 weeks     Call MD for:  temperature >100.4     Call MD for:  persistent nausea and vomiting or diarrhea     Call MD for:  severe uncontrolled pain     Call MD for:  redness, tenderness, or signs of infection (pain, swelling, redness, odor or green/yellow discharge around incision site)     Call MD for:  difficulty breathing or increased cough     No dressing needed   Order Comments: Ok to shower in 24 hours, wash wound with soap and water, keep incision clean and dry at all times, no swimming, no tub bathing.   Steristrips will fall off on their own       Medications:  Reconciled Home Medications:   Current Discharge Medication List      START taking these medications    Details   hydrocodone-acetaminophen (HYCET) solution 7.5-325 mg/15mL Take 15 mLs by mouth every 4 (four) hours as needed.  Qty: 118 mL, Refills: 0         CONTINUE these medications which have NOT CHANGED    Details   benazepril (LOTENSIN) 20 MG tablet Take 20 mg by mouth every morning.       esomeprazole (NEXIUM) 40 MG capsule Take 40 mg by mouth 2 (two) times daily before meals.      lovastatin (MEVACOR) 20 MG tablet Take 20 mg by mouth every morning.       spironolactone-hydrochlorothiazide 25-25mg (ALDACTAZIDE) 25-25 mg Tab Take 1 tablet by mouth every morning.      aspirin 81 MG Chew Take 81 mg by mouth once daily.           Time spent on the discharge of patient: 15 minutes    Ar Abdul MD  General Surgery  [facility]

## 2017-08-19 NOTE — PROGRESS NOTES
md pagekevan. Per Brianda, she will talk to primary team about resuming home bp meds. No new orders at this time.

## 2017-08-19 NOTE — PLAN OF CARE
Problem: Patient Care Overview  Goal: Plan of Care Review  Outcome: Ongoing (interventions implemented as appropriate)  Pt AAOX4, VSS. Surgical site CDI. PCA infusing, ETCO2 monitor on. No family at bedside. Pt is resting quietly.   No s/s infection, skin breakdown/pressure ulcers - pt moves independently well. Pt voids without difficult. IS education complete. SCD's on and functioning. Fall precautions maintained. Will resume care.

## 2017-08-19 NOTE — HOSPITAL COURSE
Underwent Lap heller myotomy and nancy fundoplication on 8/18/17    This patient was admitted to the hospital following the above rpocedure. She tolerated this procedure with no apparent complications. She was transferred to the floor where she underwent a normal post operative recovery. She was advanced to a liquid diet, and she tolerated this without any difficulties. Her pain was controlled with po medications, she was tolerating ambulation, and she had regular bowel and bladder function. She was discharged home in good condition, with follow up in 2 weeks.

## 2017-08-19 NOTE — ASSESSMENT & PLAN NOTE
Clears today, fulls after  nausea control  Off pca, start po pain control  encourage out of bed and ambulation   encourage IS  DVT prophylaxis  GI prophylaxis  Likely d/c home later today

## 2017-08-19 NOTE — PROGRESS NOTES
[facility]  General Surgery  Progress Note    Subjective:     History of Present Illness:  Patient is a 67 y.o. female presents with achalasia.  For about a year she has had increasing difficulty eating with solids and liquids (if it follows solids).  She has lost 45 pounds but some of this was due to her  passing away last year in April and she feels only 25-30 pounds were since December.  Her dysphagia is associated with reflux, regurgitation, lump in the throat, and cough.  She has had a motility showing achalasia and she has never had a c scope.       Post-Op Info:  Procedure(s) (LRB):  [id number]-[id number] (N/A)  FPGHYPKABDRONY-OVV-AJOVDYUECHOS (N/A)   1 Day Post-Op     Interval History: feels well, no nausea, no pain, tolerating liquids    Medications:  Continuous Infusions:   Scheduled Meds:   enoxaparin  40 mg Subcutaneous Q24H    sodium chloride 0.9%  3 mL Intravenous Q8H     PRN Meds:diphenhydrAMINE, hydrocodone-apap 7.5-325 MG/15 ML, naloxone, ondansetron, promethazine (PHENERGAN) IVPB     Review of patient's allergies indicates:   Allergen Reactions    Codeine     Morphine     Sulfa (sulfonamide antibiotics)      Objective:     Vital Signs (Most Recent):  Temp: 98.7 °F (37.1 °C) (08/19/17 0740)  Pulse: 66 (08/19/17 0740)  Resp: 17 (08/19/17 0740)  BP: (!) 160/71 (md knox ) (08/19/17 0740)  SpO2: 98 % (08/19/17 0740) Vital Signs (24h Range):  Temp:  [96 °F (35.6 °C)-98.7 °F (37.1 °C)] 98.7 °F (37.1 °C)  Pulse:  [52-78] 66  Resp:  [10-22] 17  SpO2:  [96 %-100 %] 98 %  BP: (121-184)/(56-77) 160/71     Weight: 76.2 kg (168 lb)  Body mass index is 30.73 kg/m².    Intake/Output - Last 3 Shifts       08/17 0700 - 08/18 0659 08/18 0700 - 08/19 0659 08/19 0700 - 08/20 0659    P.O.  0     I.V. (mL/kg)  3587.4 (47.1)     Other  0     IV Piggyback  0     Total Intake(mL/kg)  3587.4 (47.1)     Urine (mL/kg/hr)  600     Emesis/NG output  0     Other  0     Stool  0     Blood  0      Total Output   600      Net   +2987.4             Urine Occurrence  2 x 1 x    Stool Occurrence  0 x 0 x    Emesis Occurrence  0 x           Physical Exam   Constitutional: She is oriented to person, place, and time. She appears well-developed and well-nourished. No distress.   HENT:   Head: Normocephalic and atraumatic.   Eyes: Conjunctivae are normal. Right eye exhibits no discharge. Left eye exhibits no discharge. No scleral icterus.   Neck: Normal range of motion. Neck supple. No JVD present. No tracheal deviation present.   Cardiovascular: Normal rate.    Pulmonary/Chest: Effort normal. No respiratory distress.   Abdominal: Soft. She exhibits no distension. There is no tenderness.   Neurological: She is alert and oriented to person, place, and time.   Skin: Skin is warm and dry. No rash noted. She is not diaphoretic. No erythema.       Significant Labs:  CBC:   Recent Labs  Lab 08/19/17  0336   WBC 8.10   RBC 4.43   HGB 12.9   HCT 37.3      MCV 84   MCH 29.1   MCHC 34.6     BMP:   Recent Labs  Lab 08/19/17  0336   *   *   K 4.5      CO2 21*   BUN 6*   CREATININE 0.8   CALCIUM 8.4*   MG 1.7       Significant Diagnostics:  CXR: I have reviewed all pertinent results/findings within the past 24 hours and my personal findings are:  clear    Assessment/Plan:     * Achalasia    Clears today, fulls after  nausea control  Off pca, start po pain control  encourage out of bed and ambulation   encourage IS  DVT prophylaxis  GI prophylaxis  Likely d/c home later today              Ar Abdul MD  General Surgery  [facility]

## 2017-08-19 NOTE — PROGRESS NOTES
Pt discharged to home via wheelchair. Pt denies pain at the present time. Condition stable. Follows commands. IVs removed and catheter intact. Pt given prescriptions and copy of discharge home care instructions. Pt verbalized understanding of full liquids x1 week and soft diet x1 week after. Pt verbalized understanding of activity limitations and s/s of when to call the Dr. Pt also given information on followup appointment. All belongings with the pt. Pt verbalized understanding of all discharge instructions. Daughters at bedside. Pt waiting for transport.

## 2017-08-19 NOTE — SUBJECTIVE & OBJECTIVE
Interval History: feels well, no nausea, no pain, tolerating liquids    Medications:  Continuous Infusions:   Scheduled Meds:   enoxaparin  40 mg Subcutaneous Q24H    sodium chloride 0.9%  3 mL Intravenous Q8H     PRN Meds:diphenhydrAMINE, hydrocodone-apap 7.5-325 MG/15 ML, naloxone, ondansetron, promethazine (PHENERGAN) IVPB     Review of patient's allergies indicates:   Allergen Reactions    Codeine     Morphine     Sulfa (sulfonamide antibiotics)      Objective:     Vital Signs (Most Recent):  Temp: 98.7 °F (37.1 °C) (08/19/17 0740)  Pulse: 66 (08/19/17 0740)  Resp: 17 (08/19/17 0740)  BP: (!) 160/71 (md knox ) (08/19/17 0740)  SpO2: 98 % (08/19/17 0740) Vital Signs (24h Range):  Temp:  [96 °F (35.6 °C)-98.7 °F (37.1 °C)] 98.7 °F (37.1 °C)  Pulse:  [52-78] 66  Resp:  [10-22] 17  SpO2:  [96 %-100 %] 98 %  BP: (121-184)/(56-77) 160/71     Weight: 76.2 kg (168 lb)  Body mass index is 30.73 kg/m².    Intake/Output - Last 3 Shifts       08/17 0700 - 08/18 0659 08/18 0700 - 08/19 0659 08/19 0700 - 08/20 0659    P.O.  0     I.V. (mL/kg)  3587.4 (47.1)     Other  0     IV Piggyback  0     Total Intake(mL/kg)  3587.4 (47.1)     Urine (mL/kg/hr)  600     Emesis/NG output  0     Other  0     Stool  0     Blood  0     Total Output   600      Net   +2987.4             Urine Occurrence  2 x 1 x    Stool Occurrence  0 x 0 x    Emesis Occurrence  0 x           Physical Exam   Constitutional: She is oriented to person, place, and time. She appears well-developed and well-nourished. No distress.   HENT:   Head: Normocephalic and atraumatic.   Eyes: Conjunctivae are normal. Right eye exhibits no discharge. Left eye exhibits no discharge. No scleral icterus.   Neck: Normal range of motion. Neck supple. No JVD present. No tracheal deviation present.   Cardiovascular: Normal rate.    Pulmonary/Chest: Effort normal. No respiratory distress.   Abdominal: Soft. She exhibits no distension. There is no tenderness.   Neurological: She  is alert and oriented to person, place, and time.   Skin: Skin is warm and dry. No rash noted. She is not diaphoretic. No erythema.       Significant Labs:  CBC:   Recent Labs  Lab 08/19/17  0336   WBC 8.10   RBC 4.43   HGB 12.9   HCT 37.3      MCV 84   MCH 29.1   MCHC 34.6     BMP:   Recent Labs  Lab 08/19/17 0336   *   *   K 4.5      CO2 21*   BUN 6*   CREATININE 0.8   CALCIUM 8.4*   MG 1.7       Significant Diagnostics:  CXR: I have reviewed all pertinent results/findings within the past 24 hours and my personal findings are:  clear

## 2017-08-24 NOTE — PHYSICIAN QUERY
PT Name: Alena Anderson  MR #: [id number]    Physician Query Form - Nutrition Clarification     CDS/: Sara Monk               Contact information: [phone]    This form is a permanent document in the medical record.     Query Date: August 24, 2017    By submitting this query, we are merely seeking further clarification of documentation.. Please utilize your independent clinical judgment when addressing the question(s) below.    The Medical record contains the following:   Indicators  Supporting Clinical Findings Location in Medical Record    % of Estimated Energy Intake over a time frame from p.o., TF, or TPN      x Weight Status over a time frame She has lost 45 pounds but some of this was due to her  passing away last year in April and she feels only 25-30 pounds were since December   H&P 8/1/2017    Subcutaneous Fat and/or Muscle Loss      Fluid Accumulation or Edema      Reduced  Strength      x Wt / BMI / Usual Body Weight  Wt=76.2/ BMI =30.73  Progress Note 8/19/2017    Delayed Wound Healing / Failure to Thrive      x Acute or Chronic Illness    Achalasia.  Protein calorie malnutrition.         H&P 8/1/2017    Medication      Treatment      x Other  Protein calorie malnutrition.      For about a year she has had increasing difficulty eating with solids and liquids (if it follows solids).       H&P 8/1/2017     AND / ASPEN Clinical Characteristics (October 2011)  A minimum of two characteristics is recommended for diagnosing either moderate or severe malnutrition   Mild Malnutrition Moderate Malnutrition Severe Malnutrition   Energy Intake from p.o., TF or TPN. < 75% intake of estimated energy needs for less than 7 days < 75% intake of estimated energy needs for greater than 7 days < 50% intake of estimated energy needs for > 5 days   Weight Loss 1-2% in 1 month  5% in 3 months  7.5% in 6 months  10% in 1 year 1-2 % in 1 week  5% in 1 month  7.5% in 3 months  10% in 6 months  20% in 1  year > 2% in 1 week  > 5% in 1 month  > 7.5% in 3 months  > 10% in 6 months  > 20% in 1 year   Physical Findings     None *Mild subcutaneous fat and/or muscle loss  *Mild fluid accumulation  *Stage II decubitus  *Surgical wound or non-healing wound *Mod/severe subcutaneous fat and/or muscle loss  *Mod/severe fluid accumulation  *Stage III or IV decubitus  *Non-healing surgical wound     Provider, please specify diagnosis or diagnoses associated with above clinical findings.    [ ] Mild Protein-Calorie Malnutrition  [ ] Moderate Protein-Calorie Malnutrition  [xx ] Severe Protein-Calorie Malnutrition  [ ] Abnormal Weight Loss  [ ] Other Nutritional Diagnosis (please specify): ____________________________________  [ ] Other: ________________________________  [ ] Clinically Undetermined    Please document in your progress notes daily for the duration of treatment until resolved and include in your discharge summary.

## 2017-08-30 ENCOUNTER — TELEPHONE (OUTPATIENT)
Dept: SURGERY | Facility: CLINIC | Age: 67
End: 2017-08-30

## 2017-08-30 NOTE — TELEPHONE ENCOUNTER
Pt states she is doing really well, but wanting to see if can start trying other foods.  She hasn't had any problems.  Advised pt to start trying tender meats and vegetables and to make sure she chews her food well.  Pt verbalizes understanding and will call back with any other questions or concerns.    -r/s appt to the following week d/t weather.

## 2017-08-30 NOTE — TELEPHONE ENCOUNTER
----- Message from Gurdeep Cardona sent at 8/30/2017  9:32 AM CDT -----  Patient states that (s)he needs to speak with nurse in ref to rescheduling her appt for sometime next week as well ask some questions about if she can eat//please call back at [phone]//thank you

## 2017-09-07 ENCOUNTER — OFFICE VISIT (OUTPATIENT)
Dept: SURGERY | Facility: CLINIC | Age: 67
End: 2017-09-07
Payer: MEDICARE

## 2017-09-07 VITALS
HEART RATE: 71 BPM | SYSTOLIC BLOOD PRESSURE: 142 MMHG | HEIGHT: 62 IN | WEIGHT: 169.06 LBS | BODY MASS INDEX: 31.11 KG/M2 | DIASTOLIC BLOOD PRESSURE: 73 MMHG

## 2017-09-07 DIAGNOSIS — Z09 POSTOP CHECK: Primary | ICD-10-CM

## 2017-09-07 PROBLEM — K22.0 ACHALASIA: Status: RESOLVED | Noted: 2017-07-26 | Resolved: 2017-09-07

## 2017-09-07 PROCEDURE — 99999 PR PBB SHADOW E&M-EST. PATIENT-LVL III: CPT | Mod: PBBFAC,,, | Performed by: SURGERY

## 2017-09-07 PROCEDURE — 99213 OFFICE O/P EST LOW 20 MIN: CPT | Mod: PBBFAC | Performed by: SURGERY

## 2017-09-07 PROCEDURE — 99024 POSTOP FOLLOW-UP VISIT: CPT | Mod: ,,, | Performed by: SURGERY

## 2017-09-07 NOTE — LETTER
[facility] - General Surgery  [address]  [place] [address]  Phone: [phone] September 7, 2017      Larissa Mendiola MD  [address] [facility] [address]    Patient: Alena Anderson   MR Number: [id number]   YOB: 1950   Date of Visit: 9/7/2017     Dear Dr. Mendiola:    Thank you for referring Alena Anderson to me for evaluation. Below are the relevant portions of my assessment and plan of care.    Patient is status post lap Heller with Petey fundoplication 8/18/17.  She has no pain and no dysphagia but has had two spasms after heavy meals.  She feels great with the surgery.      PLAN: Advance diet over the next month as tolerated and light duty for one more month.  Follow up PRN.    If you have questions, please do not hesitate to call me. I look forward to following Alena along with you.    Sincerely,      Maco Gaitan MD   Section Head - General, Laparoscopic, Bariatric  Acute Care and Oncologic Surgery   - Surgical Weight Loss Program  [facility]    WSR/troy    CC  MD Helio Cadena MD

## 2017-09-07 NOTE — PROGRESS NOTES
HPI:  The patient is status post-Heller myotomy on 8/18/17. She has 0/10 pain. She is eating well. The patient denies nausea, vomiting, fever or other symptoms. The patient complains of a single episode around 2 AM this morning of being awakened with epigastric pain after eating a tuna sandwich for dinner last night that she thinks may have had difficulty passing -- initially she thought it might be heartburn but she took two zantac and two prilosec without that helping initially, however her symptoms resolved on their own.  Otherwise she has done very well -- did not take a single narcotic pain medication.    PHYSICAL EXAM:  Physical Exam   Constitutional: She is oriented to person, place, and time. She appears well-developed and well-nourished.   HENT:   Head: Normocephalic and atraumatic.   Eyes: EOM are normal. Pupils are equal, round, and reactive to light.   Cardiovascular: Normal rate, regular rhythm and normal heart sounds.    Pulmonary/Chest: Effort normal and breath sounds normal. No respiratory distress.   Abdominal: Soft. Bowel sounds are normal. She exhibits no distension. There is no tenderness. There is no guarding.   Laparoscopic incisions well-healed   Neurological: She is alert and oriented to person, place, and time.   Skin: Skin is warm and dry.   Psychiatric: She has a normal mood and affect. Her behavior is normal.   Nursing note and vitals reviewed.      ASSESSMENT:    The patient is doing well after surgery.     PLAN:    Follow up PRN.  Activity: regular duty

## 2017-09-07 NOTE — PROGRESS NOTES
I have seen the patient, reviewed the Resident's history and physical, assessment and plan. I have personally interviewed and examined the patient at bedside and: agree with the findings.     S/p naun Eric with Petey fundoplication 8/18/17.  She has no pain and no dysphagia but has had two spasms after heavy meals.  She feels great with the surgery.  Advance diet over the next month as tolerated and light duty for one more month.  Follow up prn.

## 2019-05-04 NOTE — OP NOTE
Surgery Date: 8/18/2017     Surgeon(s) and Role:     * Maco Gaitan MD - Primary     * Ar Abdul MD - Resident - Assisting    Pre-op Diagnosis:  Achalasia [K22.0]    Post-op Diagnosis:  Achalasia [K22.0]    Procedure(s) (LRB):  BMAMFZJ-[id number]-VVSDABSCDDTK (N/A)  DROHESVDSVLTJR-KMU-EHLSHRALIFHN (N/A)    Anesthesia: General    PROCEDURE IN DETAIL: The patient was placed on a split leg table under general   anesthesia. The legs were abducted. The abdomen was prepped and draped in the   usual manner. Access to peritoneum was gained through the umbilicus using the   Optiview trocar under direct vision. Pneumoperitoneum to 15 mmHg with CO2 gas   was obtained. Four 5 mm trocars were placed subcostally at 11 and 20 cm on the   left side and 7 and 15 cm on the right side. A liver retractor was placed,   exposing the hiatus. The lesser sac was entered above and preserving the vagal   branch to the liver with the Harmonic scalpel, was taken up over the esophageal   hiatus. The right and left crura were dissected free. The greater curve was   taken down approximately a third of the way down from the lower esophageal   sphincter, going all the way to the base of left kristin and taking all posterior   gastric attachments with the Harmonic scalpel. The blunt dissection occurred   into the mediastinum until 5 or 6 cm of esophagus could be pulled into the   abdominal cavity without difficulty. We reflected the anterior vagus nerves   into the patient's right and removed the epiphrenic fat pad from the anterior   esophagus. We started our myotomy 2 cm on the esophagus with the Metzenbaum   scissors and we went up onto the esophagus for 4 to 5 cm using the hook cautery.  We brought the myotomy down on to the stomach for 2 cm using blunt dissection.  Endoscopy was then performed. There were no air leaks or enterotomies seen   and the path into the stomach appeared without any obstruction.  There was not repaired with one  plegeted 0 Neurolon suture.  The angle of   His was reapproximated by attaching the fundus of the stomach to the left crura   with a 2-0 Nurolon suture. The stomach was reflected over the esophagus and   attached to the crura anteriorly and then with a mat and then with the 2-0   Nurolon and then with a third 2-0 Nurolon that was pulled over further to the   right kristin. We then checked for optimal floppiness underneath the   fundoplication. The abdomen was inspected for hemostasis. Liver retractor was   removed. The trocars were removed under direct vision. Prior to removing the   last trocar, pneumoperitoneum was allowed to escape. The fascia at the naval   was closed with 0 Vicryl. The skin incisions were infiltrated with Marcaine   solution, closed with 4-0 plain catgut and reinforced with Mastisol,   Steri-Strips, and Band-Aids. The patient tolerated procedure well and was   brought to Recovery Room in stable condition. Sponge and needle counts were   correct at the end of the case.  COMPLICATIONS: None.  BLOOD LOSS: Minimal.  PATHOLOGY: None.     no

## (undated) DEVICE — ADHESIVE MASTISOL VIAL 48/BX

## (undated) DEVICE — CLOSURE SKIN STERI STRIP 1/4X3

## (undated) DEVICE — SEE MEDLINE ITEM 152622

## (undated) DEVICE — TUBING HF INSUFFLATION W/ FLTR

## (undated) DEVICE — TROCAR ENDOPATH XCEL 5X100MM

## (undated) DEVICE — TROCAR ENDOPATH XCEL 11MM 10CM

## (undated) DEVICE — NDL HYPO REG 25G X 1 1/2

## (undated) DEVICE — TROCAR ENDOPATH XCEL 5MM 7.5CM

## (undated) DEVICE — SUT ENDOLOOP PDSII 18 LIGA

## (undated) DEVICE — ELECTRODE REM PLYHSV RETURN 9

## (undated) DEVICE — TRAY MINOR GEN SURG

## (undated) DEVICE — SEE MEDLINE ITEM 157131

## (undated) DEVICE — SOL NS 1000CC

## (undated) DEVICE — TROCAR ENDOPATH ECEL

## (undated) DEVICE — SUT GUT PL. 4-0 27 FS-2

## (undated) DEVICE — BLADE SURG CARBON STEEL SZ11

## (undated) DEVICE — LUBRICANT SURGILUBE 2 OZ

## (undated) DEVICE — BANDAGE ADHESIVE

## (undated) DEVICE — NDL SPINAL SPINOCAN 22GX3.5

## (undated) DEVICE — SUT D SPECIAL NUROLON 0 ENS

## (undated) DEVICE — SCISSOR 5MMX35CM DIRECT DRIVE

## (undated) DEVICE — WARMER DRAPE STERILE LF

## (undated) DEVICE — SUT D SPECIAL

## (undated) DEVICE — LOOP VESSEL BLUE MAXI

## (undated) DEVICE — IRRIGATOR ENDOSCOPY DISP.

## (undated) DEVICE — DRESSING LEUKOPLAST FLEX 1X3IN

## (undated) DEVICE — APPLICATOR CHLORAPREP ORN 26ML

## (undated) DEVICE — DRAPE STERI INSTRUMENT 1018

## (undated) DEVICE — SHEARS HARMONIC 5CM 36CM

## (undated) DEVICE — PACK LAPSCP/PELVSCPY III TIBRN